# Patient Record
Sex: FEMALE | Race: BLACK OR AFRICAN AMERICAN | Employment: UNEMPLOYED | URBAN - METROPOLITAN AREA
[De-identification: names, ages, dates, MRNs, and addresses within clinical notes are randomized per-mention and may not be internally consistent; named-entity substitution may affect disease eponyms.]

---

## 2020-12-14 ENCOUNTER — HOSPITAL ENCOUNTER (OUTPATIENT)
Age: 55
Discharge: HOME | End: 2020-12-14
Payer: MEDICARE

## 2020-12-14 PROCEDURE — U0003 INFECTIOUS AGENT DETECTION BY NUCLEIC ACID (DNA OR RNA); SEVERE ACUTE RESPIRATORY SYNDROME CORONAVIRUS 2 (SARS-COV-2) (CORONAVIRUS DISEASE [COVID-19]), AMPLIFIED PROBE TECHNIQUE, MAKING USE OF HIGH THROUGHPUT TECHNOLOGIES AS DESCRIBED BY CMS-2020-01-R: HCPCS

## 2020-12-15 ENCOUNTER — ANESTHESIA EVENT (OUTPATIENT)
Dept: OPERATING ROOM | Age: 55
End: 2020-12-15
Payer: MEDICARE

## 2020-12-15 LAB
SARS-COV-2, RAPID: NORMAL
SARS-COV-2: NORMAL
SARS-COV-2: NOT DETECTED
SOURCE: NORMAL

## 2020-12-16 ENCOUNTER — HOSPITAL ENCOUNTER (OUTPATIENT)
Age: 55
Setting detail: OUTPATIENT SURGERY
Discharge: HOME | End: 2020-12-16
Attending: PODIATRIST | Admitting: PODIATRIST
Payer: MEDICARE

## 2020-12-16 ENCOUNTER — ANESTHESIA (OUTPATIENT)
Dept: OPERATING ROOM | Age: 55
End: 2020-12-16
Payer: MEDICARE

## 2020-12-16 VITALS
SYSTOLIC BLOOD PRESSURE: 121 MMHG | TEMPERATURE: 97.5 F | HEART RATE: 69 BPM | DIASTOLIC BLOOD PRESSURE: 67 MMHG | RESPIRATION RATE: 12 BRPM | OXYGEN SATURATION: 100 %

## 2020-12-16 VITALS — TEMPERATURE: 96.8 F | DIASTOLIC BLOOD PRESSURE: 75 MMHG | SYSTOLIC BLOOD PRESSURE: 124 MMHG | OXYGEN SATURATION: 100 %

## 2020-12-16 LAB
GFR NON-AFRICAN AMERICAN: 45 ML/MIN
GFR SERPL CREATININE-BSD FRML MDRD: 55 ML/MIN
GFR SERPL CREATININE-BSD FRML MDRD: ABNORMAL ML/MIN/{1.73_M2}
GLUCOSE BLD-MCNC: 109 MG/DL (ref 74–100)
GLUCOSE BLD-MCNC: 91 MG/DL (ref 65–105)
POC CREATININE: 1.23 MG/DL (ref 0.51–1.19)
POC POTASSIUM: 4.2 MMOL/L (ref 3.5–4.5)

## 2020-12-16 PROCEDURE — 82565 ASSAY OF CREATININE: CPT

## 2020-12-16 PROCEDURE — 82947 ASSAY GLUCOSE BLOOD QUANT: CPT

## 2020-12-16 PROCEDURE — 2500000003 HC RX 250 WO HCPCS: Performed by: NURSE ANESTHETIST, CERTIFIED REGISTERED

## 2020-12-16 PROCEDURE — 3700000001 HC ADD 15 MINUTES (ANESTHESIA): Performed by: PODIATRIST

## 2020-12-16 PROCEDURE — 7100000040 HC SPAR PHASE II RECOVERY - FIRST 15 MIN: Performed by: PODIATRIST

## 2020-12-16 PROCEDURE — 2500000003 HC RX 250 WO HCPCS: Performed by: PODIATRIST

## 2020-12-16 PROCEDURE — C1713 ANCHOR/SCREW BN/BN,TIS/BN: HCPCS | Performed by: PODIATRIST

## 2020-12-16 PROCEDURE — 2720000010 HC SURG SUPPLY STERILE: Performed by: PODIATRIST

## 2020-12-16 PROCEDURE — 2580000003 HC RX 258: Performed by: ANESTHESIOLOGY

## 2020-12-16 PROCEDURE — 84132 ASSAY OF SERUM POTASSIUM: CPT

## 2020-12-16 PROCEDURE — 6360000002 HC RX W HCPCS: Performed by: NURSE ANESTHETIST, CERTIFIED REGISTERED

## 2020-12-16 PROCEDURE — 3600000014 HC SURGERY LEVEL 4 ADDTL 15MIN: Performed by: PODIATRIST

## 2020-12-16 PROCEDURE — 7100000041 HC SPAR PHASE II RECOVERY - ADDTL 15 MIN: Performed by: PODIATRIST

## 2020-12-16 PROCEDURE — 2580000003 HC RX 258: Performed by: PODIATRIST

## 2020-12-16 PROCEDURE — 3700000000 HC ANESTHESIA ATTENDED CARE: Performed by: PODIATRIST

## 2020-12-16 PROCEDURE — 2709999900 HC NON-CHARGEABLE SUPPLY: Performed by: PODIATRIST

## 2020-12-16 PROCEDURE — 3600000004 HC SURGERY LEVEL 4 BASE: Performed by: PODIATRIST

## 2020-12-16 DEVICE — K WIRE FIX L4IN DIA0.045IN DBL END TRCR SMOOTH: Type: IMPLANTABLE DEVICE | Site: TOES | Status: FUNCTIONAL

## 2020-12-16 RX ORDER — CALCIUM CARBONATE 500(1250)
1 TABLET ORAL 3 TIMES DAILY
COMMUNITY
End: 2022-09-17

## 2020-12-16 RX ORDER — ATORVASTATIN CALCIUM 40 MG/1
40 TABLET, FILM COATED ORAL DAILY
COMMUNITY

## 2020-12-16 RX ORDER — LIDOCAINE HYDROCHLORIDE 10 MG/ML
INJECTION, SOLUTION EPIDURAL; INFILTRATION; INTRACAUDAL; PERINEURAL PRN
Status: DISCONTINUED | OUTPATIENT
Start: 2020-12-16 | End: 2020-12-16 | Stop reason: SDUPTHER

## 2020-12-16 RX ORDER — HYDROCODONE BITARTRATE AND ACETAMINOPHEN 5; 325 MG/1; MG/1
1 TABLET ORAL EVERY 6 HOURS PRN
Qty: 28 TABLET | Refills: 0 | Status: SHIPPED | OUTPATIENT
Start: 2020-12-16 | End: 2020-12-23

## 2020-12-16 RX ORDER — FENTANYL CITRATE 50 UG/ML
INJECTION, SOLUTION INTRAMUSCULAR; INTRAVENOUS PRN
Status: DISCONTINUED | OUTPATIENT
Start: 2020-12-16 | End: 2020-12-16 | Stop reason: SDUPTHER

## 2020-12-16 RX ORDER — CARVEDILOL 6.25 MG/1
6.25 TABLET ORAL 2 TIMES DAILY
COMMUNITY

## 2020-12-16 RX ORDER — FUROSEMIDE 20 MG/1
20 TABLET ORAL 2 TIMES DAILY
COMMUNITY
End: 2022-09-17

## 2020-12-16 RX ORDER — MAGNESIUM HYDROXIDE 1200 MG/15ML
LIQUID ORAL CONTINUOUS PRN
Status: COMPLETED | OUTPATIENT
Start: 2020-12-16 | End: 2020-12-16

## 2020-12-16 RX ORDER — LANOLIN ALCOHOL/MO/W.PET/CERES
1000 CREAM (GRAM) TOPICAL DAILY
COMMUNITY

## 2020-12-16 RX ORDER — PROPOFOL 10 MG/ML
INJECTION, EMULSION INTRAVENOUS PRN
Status: DISCONTINUED | OUTPATIENT
Start: 2020-12-16 | End: 2020-12-16 | Stop reason: SDUPTHER

## 2020-12-16 RX ORDER — PROPOFOL 10 MG/ML
INJECTION, EMULSION INTRAVENOUS CONTINUOUS PRN
Status: DISCONTINUED | OUTPATIENT
Start: 2020-12-16 | End: 2020-12-16 | Stop reason: SDUPTHER

## 2020-12-16 RX ORDER — BUPIVACAINE HYDROCHLORIDE 5 MG/ML
INJECTION, SOLUTION EPIDURAL; INTRACAUDAL PRN
Status: DISCONTINUED | OUTPATIENT
Start: 2020-12-16 | End: 2020-12-16 | Stop reason: ALTCHOICE

## 2020-12-16 RX ORDER — LISINOPRIL 5 MG/1
5 TABLET ORAL DAILY
COMMUNITY

## 2020-12-16 RX ORDER — ONDANSETRON 2 MG/ML
INJECTION INTRAMUSCULAR; INTRAVENOUS PRN
Status: DISCONTINUED | OUTPATIENT
Start: 2020-12-16 | End: 2020-12-16 | Stop reason: SDUPTHER

## 2020-12-16 RX ORDER — GLYCOPYRROLATE 1 MG/5 ML
SYRINGE (ML) INTRAVENOUS PRN
Status: DISCONTINUED | OUTPATIENT
Start: 2020-12-16 | End: 2020-12-16 | Stop reason: SDUPTHER

## 2020-12-16 RX ORDER — SODIUM CHLORIDE, SODIUM LACTATE, POTASSIUM CHLORIDE, CALCIUM CHLORIDE 600; 310; 30; 20 MG/100ML; MG/100ML; MG/100ML; MG/100ML
INJECTION, SOLUTION INTRAVENOUS CONTINUOUS
Status: DISCONTINUED | OUTPATIENT
Start: 2020-12-16 | End: 2020-12-16 | Stop reason: HOSPADM

## 2020-12-16 RX ORDER — LIDOCAINE HYDROCHLORIDE 10 MG/ML
INJECTION, SOLUTION EPIDURAL; INFILTRATION; INTRACAUDAL; PERINEURAL PRN
Status: DISCONTINUED | OUTPATIENT
Start: 2020-12-16 | End: 2020-12-16 | Stop reason: ALTCHOICE

## 2020-12-16 RX ADMIN — PROPOFOL 40 MG: 10 INJECTION, EMULSION INTRAVENOUS at 07:32

## 2020-12-16 RX ADMIN — SODIUM CHLORIDE, POTASSIUM CHLORIDE, SODIUM LACTATE AND CALCIUM CHLORIDE: 600; 310; 30; 20 INJECTION, SOLUTION INTRAVENOUS at 06:30

## 2020-12-16 RX ADMIN — Medication 0.2 MG: at 07:44

## 2020-12-16 RX ADMIN — LIDOCAINE HYDROCHLORIDE 50 MG: 10 INJECTION, SOLUTION EPIDURAL; INFILTRATION; INTRACAUDAL; PERINEURAL at 07:32

## 2020-12-16 RX ADMIN — ONDANSETRON 4 MG: 2 INJECTION INTRAMUSCULAR; INTRAVENOUS at 07:47

## 2020-12-16 RX ADMIN — FENTANYL CITRATE 100 MCG: 50 INJECTION, SOLUTION INTRAMUSCULAR; INTRAVENOUS at 07:25

## 2020-12-16 RX ADMIN — PROPOFOL 50 MCG/KG/MIN: 10 INJECTION, EMULSION INTRAVENOUS at 07:32

## 2020-12-16 ASSESSMENT — PULMONARY FUNCTION TESTS
PIF_VALUE: 0
PIF_VALUE: 51
PIF_VALUE: 0
PIF_VALUE: 29
PIF_VALUE: 0
PIF_VALUE: 1
PIF_VALUE: 31
PIF_VALUE: 0
PIF_VALUE: 28
PIF_VALUE: 0
PIF_VALUE: 1
PIF_VALUE: 0
PIF_VALUE: 0
PIF_VALUE: 1
PIF_VALUE: 0
PIF_VALUE: 27
PIF_VALUE: 0
PIF_VALUE: 0
PIF_VALUE: 1
PIF_VALUE: 0
PIF_VALUE: 1
PIF_VALUE: 1
PIF_VALUE: 0
PIF_VALUE: 26
PIF_VALUE: 0

## 2020-12-16 ASSESSMENT — ENCOUNTER SYMPTOMS
WHEEZING: 0
VOMITING: 0
COUGH: 0
SHORTNESS OF BREATH: 0
NAUSEA: 0
DIARRHEA: 0
SORE THROAT: 0

## 2020-12-16 ASSESSMENT — PAIN SCALES - GENERAL: PAINLEVEL_OUTOF10: 0

## 2020-12-16 NOTE — H&P
History and Physical    Pt Name: Lavonne Smith  MRN: [id number]  YOB: 1965  Date of evaluation: 12/16/2020  Primary Care Physician: Annette Larsen    SUBJECTIVE:   History of Chief Complaint:    Lavonne Smith is a 48 y.o. female who is scheduled today for left toe arthrodesis. She reports arthritis of the left hallux and also reports having a wound on the bottom of the toe for over one month. She reports history of DM for \"some years\" and reports it is now diet controlled after sleeve gastrectomy in October of 2020, down 55 lbs since. She denies pain to left great toe at this time, but reports she has been doing home dressing changes. She had had prior great toe nail excision bilaterally in the past.   Allergies  has No Known Allergies. Medications  Prior to Admission medications    Medication Sig Start Date End Date Taking?  Authorizing Provider   lisinopril (PRINIVIL;ZESTRIL) 5 MG tablet Take 5 mg by mouth daily   Yes Historical Provider, MD   atorvastatin (LIPITOR) 40 MG tablet Take 40 mg by mouth daily   Yes Historical Provider, MD   SPIRONOLACTONE PO Take by mouth Pt states she takes half a pill once a day but doesn't know the dose   Yes Historical Provider, MD   furosemide (LASIX) 20 MG tablet Take 20 mg by mouth 2 times daily   Yes Historical Provider, MD   carvedilol (COREG) 12.5 MG tablet Take by mouth 2 times daily Pt states she does not know the dose but takes a half a pil BID   Yes Historical Provider, MD   calcium carbonate (OSCAL) 500 MG TABS tablet Take 1 tablet by mouth 3 times daily    Yes Historical Provider, MD   Prenatal Vit-Fe Fumarate-FA (PRENATAL COMPLETE PO) Take by mouth   Yes Historical Provider, MD   Cyanocobalamin (VITAMIN B-12 PO) Take 1 tablet by mouth   Yes Historical Provider, MD   Albuterol Sulfate (PROAIR HFA IN) Inhale into the lungs   Yes Historical Provider, MD   DOXYCYCLINE HYCLATE PO Take 1 tablet by mouth 2 times daily   Yes Historical Provider, MD     Past Medical History    has a past medical history of Arthritis of big toe, Asthma, CHF (congestive heart failure) ([facility] [address] CKD (chronic kidney disease), stage III, Diabetes mellitus ([address] History of anemia, History of blood transfusion, Hyperlipidemia, Hypertension, Left ventricular systolic dysfunction, Neuropathy, Nonischemic congestive cardiomyopathy ([address] and Obesity. Past Surgical History   has a past surgical history that includes  section; Sleeve Gastrectomy (10/07/2020); Cardiac catheterization (2020); and Facial cosmetic surgery (). Social History   reports that she has never smoked. She has never used smokeless tobacco.   reports previous alcohol use. reports no history of drug use. Marital Status    Children 5  Occupation none  Family History    Family history is unknown by patient. Says she doesn't ask, thinks htn in her mother/mother's side    OBJECTIVE:   VITALS:  temporal temperature is 96.8 °F (36 °C). Her blood pressure is 126/66 and her pulse is 63. Her respiration is 20 and oxygen saturation is 99%. CONSTITUTIONAL:Alert and orientated to person, place and time. No acute distress. Friendly. Quiet affect, vague historian. SKIN:  Warm & dry, no rashes on exposed skin, left great toe wrapped in gauze  HEENT: HEAD: Normocephalic, atraumatic        EYES:  PERRL, EOMs intact, conjunctiva clear      EARS:  Equal bilaterally, no edema or thickening, skin is intact without lumps or lesions. No discharge. NOSE:  Nares patent, septum midline, no rhinorrhea      MOUTH/THROAT:  Mucous membranes moist, tongue is pink, uvula midline, teeth appear to be intact  NECK:  Supple, no lymphadenopathy, full ROM  LUNGS: Respirations even and non-labored.  Clear to auscultation bilaterally, no wheezes/rales/rhonchi   CARDIOVASCULAR: regular rate and rhythm, no murmurs/rubs/gallops   ABDOMEN: soft, non-tender, non-distended, bowel sounds active x 4, obese  MUSCULOSKELETAL: Full ROM bilateral upper extremities, Full ROM bilateral lower extremities. Strength of 5/5 bilateral upper extremities. Strength 5/5 bilateral lower extremities. Left great toe in gauze dressing. Movement intact. VASCULAR:  Brisk cap refill bilateral fingers. Radial pulses are intact, 2+ bilaterally. Dorsalis pedis pulse 2+ bilaterally. No edema or varicosities bilateral lower extremities, large lower extremities  NEUROLOGIC: CN II-XII are grossly intact. Gait not assessed. IMPRESSIONS:   OSTEOARTHRITIS LEFT HALLUX      Diagnosis Date    Arthritis of big toe     Asthma     CH[facility]congestive [facility]     Dr. Yoseph Hdez    CKD (chronic kidney disease), stage III     Diabetes mellitus ([facility] [address]     controlled by diet and weight loss (sleeve gastrectomy)    History of anemia     History of blood transfusion     Hyperlipidemia     Hypertension     Left ventricular systolic dysfunction     per care everywhere notes    Neuropathy     Nonischemic congestive cardiomyopathy ([facility] [address]     per careeverywhere notes    Obesity    1.    PLANS:   TOE ARTHRODESIS (HALLUX) - Left      LISSETH OCHOA APRN-CNP  Electronically signed 12/16/2020 at 7:25 AM

## 2020-12-16 NOTE — OP NOTE
PATIENT NAME: Layla Swann  YOB: 1965  -  48 y.o. female  MRN: [id number]  DATE: 12/16/2020  BILLING #: [id number]     Surgeon(s):  Kody Salamanca DPM      ASSISTANTS: Oscar Lara DPM     PRE-OP DIAGNOSIS:   1. Osteoarthritis of the hallux interphalangeal joint, left     POST-OP DIAGNOSIS: Same as above.     PROCEDURE:   1. Hallux interphalangeal joint arthrodesis, left     ANESTHESIA: MAC/Local (10cc of a 1:1 mixture of 1% lidocaine plain and 0.5% marcaine plain)     HEMOSTASIS: Pneumatic ankle tourniquet @ 250 mmHg.     ESTIMATED BLOOD LOSS: Less than 15cc.     MATERIALS:   Implant Name Type Inv. Item Serial No.  Lot No. LRB No. Used Action   K WIRE FIX L4IN DIA0.045IN DBL END TRCR SMOOTH   K WIRE FIX L4IN DIA0.045IN DBL END TRCR SMOOTH   [employer]   Left 2 Implanted         INJECTABLES: None.     SPECIMEN:   * No specimens in log *     COMPLICATIONS: None.     FINDINGS: As expected. INDICATION FOR PROCEDURE: Patient has had a painful osteoarthritis of the left hallux interphalangeal joint for some time. The patient has failed conservative treatments and elects to undergo surgical correction. Risks and benefits were discussed. No guarantees were given or implied. Consent is signed and in the chart. PROCEDURE IN DETAIL: The patient was transported from pre-op to the operating room and placed on the operating table in the supine position with a safety strap across the lap. A pneumatic ankle tourniquet was applied. After adequate sedation by the Anesthesia, a Rod block of 10cc of 1:1 mixture of 1% lidocaine plain and 0.5% Marcaine plain was injected. The foot was then prepped and draped in the usual aseptic fashion. The foot was then exsanguinated with an Esmarch bandage. The pneumatic ankle tourniquet was inflated to 250 mmHg. Attention was directed to the left hallux interphalangeal joint. A dorsal medial incision was created over with a #15 blade.  The incision was then deepened. The skin and subcutaneous tissue were then undermined off of the capsule medially. The periosteum and capsule were then reflected and retracted. Next, a transverse incision was created over the extensor tendon in the hallux interphalangeal joint was visualized. Next, using the sagittal saw the head of the proximal phalanx was resected at the surgical neck and the base of the distal phalanx was sharply resected taking care to create a laterally based wedge to correct for the transverse plane deformity of the hallux. The surgical site was then irrigated with normal saline. The arthrodesis site was fixated with 2 crossed 0.045 Umu wires inserted from proximal to distal.  The wires were then bent, cut and capped. The surgical site was then irrigated once more with normal saline and then coapted sequentially in a layered fashion. Dressings consisted of adapic, 4 x 4s, Kerlix and an ACE bandage. The pneumatic ankle tourniquet was released and immediate hyperemic flush was noted to all five digits of the left foot. The patient tolerated the above procedure and anesthesia well without complications. The patient was transported from the operating room to the PACU with vital signs stable and vascular status intact to the foot. The patient is to follow up with Ghada Ko DPM as scheduled. Reiterated to the patient and her significant other the utmost importance of non-weight bearing except on the heel for transfers to allow for healing of her arthrodesis, the patient verbalized understanding of this.       Electronically signed by Brittany Mercado DPM on 12/16/2020 at 7:02 AM

## 2020-12-16 NOTE — H&P
History & Physical Examination  Podiatric Medicine and Surgery     Subjective     Chief Complaint: Left hallux osteoarthritis    HPI:  Loly Renae is a 48 y.o. female seen [facility] the left hallux interphalangeal joint. The patient has failed conservative therapies and presents today for surgical correction. Denies any other pedal complaints. PCP is Tri Caal    ROS:   Review of Systems   Constitutional: Negative for chills, fatigue and fever. HENT: Negative for congestion and sore throat. Respiratory: Negative for cough, shortness of breath and wheezing. Cardiovascular: Negative for chest pain and leg swelling. Gastrointestinal: Negative for diarrhea, nausea and vomiting. Endocrine: Negative for cold intolerance and heat intolerance. Genitourinary: Negative for difficulty urinating and dysuria. Musculoskeletal: Positive for arthralgias and myalgias. Negative for neck stiffness. Skin: Positive for wound. Negative for rash. Neurological: Negative for dizziness, weakness, numbness and headaches. Psychiatric/Behavioral: Negative for agitation and behavioral problems. Past Medical History   has a past medical history of Asthma, CHF (congestive heart failure) ([facility] [address] Diabetes mellitus ([facility] [address] Hyperlipidemia, and Hypertension. Past Surgical History   has a past surgical history that includes  section and other surgical history. Medications  Prior to Admission medications    Medication Sig Start Date End Date Taking?  Authorizing Provider   lisinopril (PRINIVIL;ZESTRIL) 5 MG tablet Take 5 mg by mouth daily   Yes Historical Provider, MD   atorvastatin (LIPITOR) 40 MG tablet Take 40 mg by mouth daily   Yes Historical Provider, MD   SPIRONOLACTONE PO Take by mouth Pt states she takes half a pill once a day but doesn't know the dose   Yes Historical Provider, MD   furosemide (LASIX) 20 MG tablet Take 20 mg by mouth 2 times daily   Yes Historical Provider, MD   carvedilol (COREG) 12.5 MG tablet Take by mouth 2 times daily Pt states she does not know the dose but takes a half a pil BID   Yes Historical Provider, MD   calcium carbonate (OSCAL) 500 MG TABS tablet Take 1 tablet by mouth 3 times daily    Yes Historical Provider, MD   Prenatal Vit-Fe Fumarate-FA (PRENATAL COMPLETE PO) Take by mouth   Yes Historical Provider, MD   Cyanocobalamin (VITAMIN B-12 PO) Take 1 tablet by mouth   Yes Historical Provider, MD   Albuterol Sulfate (PROAIR HFA IN) Inhale into the lungs   Yes Historical Provider, MD   DOXYCYCLINE HYCLATE PO Take 1 tablet by mouth 2 times daily   Yes Historical Provider, MD    Scheduled Meds:  Continuous Infusions:   lactated ringers      sodium chloride       PRN Meds:.sodium chloride    Allergies  has No Known Allergies. Family History  family history is not on file. Social History   reports that she has never smoked. She has never used smokeless tobacco.   reports previous alcohol use. reports no history of drug use. Objective     Vitals:  Patient Vitals for the past 8 hrs:   BP Temp Temp src Pulse Resp SpO2   20 0651 126/66 96.8 °F (36 °C) Temporal 63 20 99 %     Average, Min, and Max for last 24 hours Vitals:  TEMPERATURE:  Temp  Av.8 °F (36 °C)  Min: 96.8 °F (36 °C)  Max: 96.8 °F (36 °C)    RESPIRATIONS RANGE: Resp  Av  Min: 20  Max: 20    PULSE RANGE: Pulse  Av  Min: 63  Max: 63    BLOOD PRESSURE RANGE:  Systolic (82NCX), QJ , Min:126 , EXQ:483   ; Diastolic (71MUK), LKD:40, Min:66, Max:66      PULSE OXIMETRY RANGE: SpO2  Av %  Min: 99 %  Max: 99 %  I&O:  No intake/output data recorded. CBC:  No results for input(s): WBC, HGB, HCT, PLT, CRP in the last 72 hours. Invalid input(s):  ESR     BMP:  Recent Labs     20  0707   CREATININE 1.23*        Coags:  No results for input(s): APTT, PROT, INR in the last 72 hours.     No results found for: LABA1C  No results found for: SEDRATE   No results found for: CRP     Physical Exam:    General: A&Ox3, NAD  Heart: Regular rate and rhythm. Lungs: Equal air entry. No increased effort. Abdomen: Soft, non-tender to palpation. Not distended. Left Lower Extremity Physical Exam:  Vascular: DP and PT pulses are palpable. CFT <3 seconds to all digits. Hair growth is absent to the level of the digits. No edema. Neuro: Saph/sural/SP/DP/plantar sensation intact to light touch. Musculoskeletal: Muscle strength is 5/5 to all lower extremity muscle groups. Gross deformity is absent. Dermatologic: Partial thickness wound left distal hallux. No active bleeding or purulence noted. Clinical:  None. Imaging:   No orders to display         Assessment     Isrrael Martin is a 48 y.o. female with   1. Osteoarthritis of the hallux interphalangeal joint, left    Active Problems:    * No active hospital problems. *  Resolved Problems:    * No resolved hospital problems. *       Plan     · Patient examined and evaluated at bedside. · Treatment options discussed in detail with the patient. · Will proceed with left HIPJ arthrodesis. · Discussed with Dr. Rocco Baker.       Electronically signed by David Moore DPM on 12/16/2020 at 7:18 AM

## 2020-12-16 NOTE — ANESTHESIA POSTPROCEDURE EVALUATION
Department of Anesthesiology  Postprocedure Note    Patient: Robert Reynolds  MRN: [id number]  YOB: 1965  Date of evaluation: 12/16/2020  Time:  11:58 AM     Procedure Summary     Date: 12/16/20 Room / Location: [address]    Anesthesia Start: 5815 Anesthesia Stop: 0825    Procedure: TOE ARTHRODESIS ([address]) (Left ) Diagnosis: (OSTEOARTHRITIS LEFT HALLUX)    Surgeons: Gustavo Leach DPM Responsible Provider: Bernard Roberts MD    Anesthesia Type: MAC, TIVA ASA Status: 3          Anesthesia Type: MAC, TIVA    [place] Phase I: [place] Score: 8    [place] Phase II:      Last vitals: Reviewed and per EMR flowsheets.        Anesthesia Post Evaluation    Patient location during evaluation: PACU  Patient participation: complete - patient participated  Level of consciousness: awake and alert  Pain score: 0  Airway patency: patent  Nausea & Vomiting: no nausea and no vomiting  Complications: no  Cardiovascular status: hemodynamically stable  Respiratory status: room air  Hydration status: euvolemic

## 2020-12-16 NOTE — ANESTHESIA PRE PROCEDURE
controled by diet and wieght loss    Hyperlipidemia     Hypertension        Past Surgical History:        Procedure Laterality Date     SECTION      x2    OTHER SURGICAL HISTORY      weight loss surgery       Social History:    Social History     Tobacco Use    Smoking status: Never Smoker    Smokeless tobacco: Never Used   Substance Use Topics    Alcohol use: Not on file                                Counseling given: Not Answered      Vital Signs (Current):   Vitals:    20 0651   BP: 126/66   Pulse: 63   Resp: 20   Temp: 96.8 °F (36 °C)   TempSrc: Temporal   SpO2: 99%                                              BP Readings from Last 3 Encounters:   20 126/66       NPO Status: Time of last liquid consumption:                         Time of last solid consumption:                         Date of last liquid consumption: 12/15/20                        Date of last solid food consumption: 12/15/20    BMI:   Wt Readings from Last 3 Encounters:   No data found for Wt     There is no height or weight on file to calculate BMI.    CBC: No results found for: WBC, RBC, HGB, HCT, MCV, RDW, PLT    CMP:   Lab Results   Component Value Date    CREATININE 1.23 2020    LABGLOM 45 2020       POC Tests:   Recent Labs     20  0707   POCGLU 109*   POCK 4.2       Coags: No results found for: PROTIME, INR, APTT    HCG (If Applicable): No results found for: PREGTESTUR, PREGSERUM, HCG, HCGQUANT     ABGs: No results found for: PHART, PO2ART, XID4YUT, PPR7DYF, BEART, [id number]     Type & Screen (If Applicable):  No results found for: LABABO, LABRH    Drug/Infectious Status (If Applicable):  No results found for: HIV, HEPCAB    COVID-19 Screening (If Applicable):   Lab Results   Component Value Date    COVID19 Not Detected 2020         Anesthesia Evaluation  Patient summary reviewed and Nursing notes reviewed no history of anesthetic complications:   Airway: Mallampati: II TM distance: >3 FB   Neck ROM: full  Mouth opening: > = 3 FB Dental: normal exam         Pulmonary:normal exam    (+) asthma:                            Cardiovascular:  Exercise tolerance: good (>4 METS),   (+) hypertension:, CHF:, hyperlipidemia      ECG reviewed  Rhythm: regular  Rate: normal      Cleared by cardiology     Beta Blocker:  Not on Beta Blocker         Neuro/Psych:   Negative Neuro/Psych ROS              GI/Hepatic/Renal:   (+) morbid obesity          Endo/Other:    (+) Diabetes, . Abdominal:           Vascular:                                        Anesthesia Plan      MAC and TIVA     ASA 3       Induction: intravenous. MIPS: Postoperative opioids intended and Prophylactic antiemetics administered. Anesthetic plan and risks discussed with patient.       Plan discussed with CRNA and surgical team.                  Orquidea Meier MD   12/16/2020 alert

## 2022-09-17 ENCOUNTER — HOSPITAL ENCOUNTER (INPATIENT)
Age: 57
LOS: 3 days | Discharge: HOME | DRG: 314 | End: 2022-09-20
Attending: EMERGENCY MEDICINE | Admitting: INTERNAL MEDICINE
Payer: MEDICARE

## 2022-09-17 ENCOUNTER — APPOINTMENT (OUTPATIENT)
Dept: GENERAL RADIOLOGY | Age: 57
DRG: 314 | End: 2022-09-17
Payer: MEDICARE

## 2022-09-17 DIAGNOSIS — M86.9 OSTEOMYELITIS OF GREAT TOE (HCC): Primary | ICD-10-CM

## 2022-09-17 DIAGNOSIS — G89.18 POST-OP PAIN: ICD-10-CM

## 2022-09-17 DIAGNOSIS — M86.072 ACUTE HEMATOGENOUS OSTEOMYELITIS OF LEFT FOOT (HCC): ICD-10-CM

## 2022-09-17 DIAGNOSIS — Z98.890 POST-OPERATIVE STATE: ICD-10-CM

## 2022-09-17 PROBLEM — Z86.73 HISTORY OF CVA (CEREBROVASCULAR ACCIDENT): Status: ACTIVE | Noted: 2022-03-24

## 2022-09-17 PROBLEM — E78.5 HYPERLIPIDEMIA, UNSPECIFIED: Status: ACTIVE | Noted: 2022-03-07

## 2022-09-17 PROBLEM — R19.7 DIARRHEA: Status: ACTIVE | Noted: 2022-09-17

## 2022-09-17 PROBLEM — D50.8 IRON DEFICIENCY ANEMIA SECONDARY TO INADEQUATE DIETARY IRON INTAKE: Status: ACTIVE | Noted: 2020-09-29

## 2022-09-17 PROBLEM — K21.9 GASTROESOPHAGEAL REFLUX DISEASE WITHOUT ESOPHAGITIS: Status: ACTIVE | Noted: 2020-05-12

## 2022-09-17 PROBLEM — I63.9 CVA (CEREBRAL VASCULAR ACCIDENT) (HCC): Status: ACTIVE | Noted: 2022-03-24

## 2022-09-17 PROBLEM — E55.9 VITAMIN D DEFICIENCY: Status: ACTIVE | Noted: 2018-09-10

## 2022-09-17 PROBLEM — N18.30 STAGE 3 CHRONIC KIDNEY DISEASE (HCC): Status: ACTIVE | Noted: 2022-03-07

## 2022-09-17 PROBLEM — I50.9 CONGESTIVE HEART FAILURE (HCC): Status: ACTIVE | Noted: 2022-09-17

## 2022-09-17 LAB
ABSOLUTE EOS #: 0.13 K/UL (ref 0–0.44)
ABSOLUTE IMMATURE GRANULOCYTE: 0.02 K/UL (ref 0–0.3)
ABSOLUTE LYMPH #: 2.41 K/UL (ref 1.1–3.7)
ABSOLUTE MONO #: 0.69 K/UL (ref 0.1–1.2)
ANION GAP SERPL CALCULATED.3IONS-SCNC: 8 MMOL/L (ref 9–17)
BASOPHILS # BLD: 0 % (ref 0–2)
BASOPHILS ABSOLUTE: <0.03 K/UL (ref 0–0.2)
BUN BLDV-MCNC: 27 MG/DL (ref 6–20)
BUN/CREAT BLD: 18 (ref 9–20)
C-REACTIVE PROTEIN: 10.4 MG/L (ref 0–5)
CALCIUM SERPL-MCNC: 8.9 MG/DL (ref 8.6–10.4)
CHLORIDE BLD-SCNC: 109 MMOL/L (ref 98–107)
CO2: 25 MMOL/L (ref 20–31)
CREAT SERPL-MCNC: 1.46 MG/DL (ref 0.5–0.9)
EOSINOPHILS RELATIVE PERCENT: 2 % (ref 1–4)
GFR AFRICAN AMERICAN: 45 ML/MIN
GFR NON-AFRICAN AMERICAN: 37 ML/MIN
GFR SERPL CREATININE-BSD FRML MDRD: ABNORMAL ML/MIN/{1.73_M2}
GLUCOSE BLD-MCNC: 62 MG/DL (ref 70–99)
HCT VFR BLD CALC: 26 % (ref 36.3–47.1)
HEMOGLOBIN: 8.5 G/DL (ref 11.9–15.1)
IMMATURE GRANULOCYTES: 0 %
LACTIC ACID: 1.2 MMOL/L (ref 0.5–2.2)
LYMPHOCYTES # BLD: 34 % (ref 24–43)
MCH RBC QN AUTO: 30.1 PG (ref 25.2–33.5)
MCHC RBC AUTO-ENTMCNC: 32.7 G/DL (ref 28.4–34.8)
MCV RBC AUTO: 92.2 FL (ref 82.6–102.9)
MONOCYTES # BLD: 10 % (ref 3–12)
NRBC AUTOMATED: 0 PER 100 WBC
PDW BLD-RTO: 12.6 % (ref 11.8–14.4)
PLATELET # BLD: 214 K/UL (ref 138–453)
PMV BLD AUTO: 9.9 FL (ref 8.1–13.5)
POTASSIUM SERPL-SCNC: 4.2 MMOL/L (ref 3.7–5.3)
RBC # BLD: 2.82 M/UL (ref 3.95–5.11)
SEDIMENTATION RATE, ERYTHROCYTE: 54 MM/HR (ref 0–30)
SEG NEUTROPHILS: 54 % (ref 36–65)
SEGMENTED NEUTROPHILS ABSOLUTE COUNT: 3.88 K/UL (ref 1.5–8.1)
SODIUM BLD-SCNC: 142 MMOL/L (ref 135–144)
WBC # BLD: 7.1 K/UL (ref 3.5–11.3)

## 2022-09-17 PROCEDURE — 6360000002 HC RX W HCPCS: Performed by: NURSE PRACTITIONER

## 2022-09-17 PROCEDURE — 87324 CLOSTRIDIUM AG IA: CPT

## 2022-09-17 PROCEDURE — 87040 BLOOD CULTURE FOR BACTERIA: CPT

## 2022-09-17 PROCEDURE — 87449 NOS EACH ORGANISM AG IA: CPT

## 2022-09-17 PROCEDURE — 1200000000 HC SEMI PRIVATE

## 2022-09-17 PROCEDURE — 2580000003 HC RX 258: Performed by: NURSE PRACTITIONER

## 2022-09-17 PROCEDURE — 73630 X-RAY EXAM OF FOOT: CPT

## 2022-09-17 PROCEDURE — 85025 COMPLETE CBC W/AUTO DIFF WBC: CPT

## 2022-09-17 PROCEDURE — 83605 ASSAY OF LACTIC ACID: CPT

## 2022-09-17 PROCEDURE — 99285 EMERGENCY DEPT VISIT HI MDM: CPT

## 2022-09-17 PROCEDURE — 87506 IADNA-DNA/RNA PROBE TQ 6-11: CPT

## 2022-09-17 PROCEDURE — 85652 RBC SED RATE AUTOMATED: CPT

## 2022-09-17 PROCEDURE — 86140 C-REACTIVE PROTEIN: CPT

## 2022-09-17 PROCEDURE — 80048 BASIC METABOLIC PNL TOTAL CA: CPT

## 2022-09-17 PROCEDURE — 99222 1ST HOSP IP/OBS MODERATE 55: CPT | Performed by: NURSE PRACTITIONER

## 2022-09-17 RX ORDER — POTASSIUM CHLORIDE 20 MEQ/1
40 TABLET, EXTENDED RELEASE ORAL PRN
Status: DISCONTINUED | OUTPATIENT
Start: 2022-09-17 | End: 2022-09-20 | Stop reason: HOSPADM

## 2022-09-17 RX ORDER — ONDANSETRON 4 MG/1
4 TABLET, ORALLY DISINTEGRATING ORAL EVERY 8 HOURS PRN
Status: DISCONTINUED | OUTPATIENT
Start: 2022-09-17 | End: 2022-09-20 | Stop reason: HOSPADM

## 2022-09-17 RX ORDER — LANOLIN ALCOHOL/MO/W.PET/CERES
1000 CREAM (GRAM) TOPICAL DAILY
Status: DISCONTINUED | OUTPATIENT
Start: 2022-09-17 | End: 2022-09-20 | Stop reason: HOSPADM

## 2022-09-17 RX ORDER — ENOXAPARIN SODIUM 100 MG/ML
40 INJECTION SUBCUTANEOUS DAILY
Status: DISCONTINUED | OUTPATIENT
Start: 2022-09-17 | End: 2022-09-18

## 2022-09-17 RX ORDER — MAGNESIUM SULFATE 1 G/100ML
1000 INJECTION INTRAVENOUS PRN
Status: DISCONTINUED | OUTPATIENT
Start: 2022-09-17 | End: 2022-09-20 | Stop reason: HOSPADM

## 2022-09-17 RX ORDER — ACETAMINOPHEN 325 MG/1
650 TABLET ORAL EVERY 6 HOURS PRN
Status: DISCONTINUED | OUTPATIENT
Start: 2022-09-17 | End: 2022-09-20 | Stop reason: HOSPADM

## 2022-09-17 RX ORDER — SODIUM CHLORIDE 0.9 % (FLUSH) 0.9 %
10 SYRINGE (ML) INJECTION PRN
Status: DISCONTINUED | OUTPATIENT
Start: 2022-09-17 | End: 2022-09-20 | Stop reason: HOSPADM

## 2022-09-17 RX ORDER — LANOLIN ALCOHOL/MO/W.PET/CERES
325 CREAM (GRAM) TOPICAL
Status: DISCONTINUED | OUTPATIENT
Start: 2022-09-18 | End: 2022-09-20 | Stop reason: HOSPADM

## 2022-09-17 RX ORDER — CARVEDILOL 6.25 MG/1
6.25 TABLET ORAL 2 TIMES DAILY WITH MEALS
Status: DISCONTINUED | OUTPATIENT
Start: 2022-09-17 | End: 2022-09-20 | Stop reason: HOSPADM

## 2022-09-17 RX ORDER — FOLIC ACID 1 MG/1
1 TABLET ORAL DAILY
COMMUNITY

## 2022-09-17 RX ORDER — SWAB
1 SWAB, NON-MEDICATED MISCELLANEOUS DAILY
Status: DISCONTINUED | OUTPATIENT
Start: 2022-09-17 | End: 2022-09-20 | Stop reason: HOSPADM

## 2022-09-17 RX ORDER — FERROUS SULFATE 325(65) MG
325 TABLET ORAL
COMMUNITY

## 2022-09-17 RX ORDER — SODIUM CHLORIDE 9 MG/ML
INJECTION, SOLUTION INTRAVENOUS PRN
Status: DISCONTINUED | OUTPATIENT
Start: 2022-09-17 | End: 2022-09-20 | Stop reason: HOSPADM

## 2022-09-17 RX ORDER — POTASSIUM CHLORIDE 7.45 MG/ML
10 INJECTION INTRAVENOUS PRN
Status: DISCONTINUED | OUTPATIENT
Start: 2022-09-17 | End: 2022-09-20 | Stop reason: HOSPADM

## 2022-09-17 RX ORDER — ONDANSETRON 2 MG/ML
4 INJECTION INTRAMUSCULAR; INTRAVENOUS EVERY 6 HOURS PRN
Status: DISCONTINUED | OUTPATIENT
Start: 2022-09-17 | End: 2022-09-20 | Stop reason: HOSPADM

## 2022-09-17 RX ORDER — ASPIRIN 81 MG/1
1 TABLET, CHEWABLE ORAL DAILY
COMMUNITY
Start: 2022-04-01

## 2022-09-17 RX ORDER — POLYETHYLENE GLYCOL 3350 17 G/17G
17 POWDER, FOR SOLUTION ORAL DAILY PRN
Status: DISCONTINUED | OUTPATIENT
Start: 2022-09-17 | End: 2022-09-20 | Stop reason: HOSPADM

## 2022-09-17 RX ORDER — SODIUM CHLORIDE 0.9 % (FLUSH) 0.9 %
5-40 SYRINGE (ML) INJECTION EVERY 12 HOURS SCHEDULED
Status: DISCONTINUED | OUTPATIENT
Start: 2022-09-17 | End: 2022-09-20 | Stop reason: HOSPADM

## 2022-09-17 RX ORDER — ALBUTEROL SULFATE 90 UG/1
1 AEROSOL, METERED RESPIRATORY (INHALATION) EVERY 4 HOURS PRN
Status: DISCONTINUED | OUTPATIENT
Start: 2022-09-17 | End: 2022-09-20 | Stop reason: HOSPADM

## 2022-09-17 RX ORDER — ATORVASTATIN CALCIUM 40 MG/1
40 TABLET, FILM COATED ORAL DAILY
Status: DISCONTINUED | OUTPATIENT
Start: 2022-09-17 | End: 2022-09-20 | Stop reason: HOSPADM

## 2022-09-17 RX ORDER — BUMETANIDE 1 MG/1
1 TABLET ORAL DAILY
Status: DISCONTINUED | OUTPATIENT
Start: 2022-09-17 | End: 2022-09-20 | Stop reason: HOSPADM

## 2022-09-17 RX ORDER — BUMETANIDE 1 MG/1
1 TABLET ORAL 2 TIMES DAILY
COMMUNITY
Start: 2022-04-21

## 2022-09-17 RX ORDER — ASPIRIN 81 MG/1
81 TABLET, CHEWABLE ORAL DAILY
Status: DISCONTINUED | OUTPATIENT
Start: 2022-09-17 | End: 2022-09-20 | Stop reason: HOSPADM

## 2022-09-17 RX ORDER — FOLIC ACID 1 MG/1
1 TABLET ORAL DAILY
Status: DISCONTINUED | OUTPATIENT
Start: 2022-09-17 | End: 2022-09-20 | Stop reason: HOSPADM

## 2022-09-17 RX ORDER — ACETAMINOPHEN 650 MG/1
650 SUPPOSITORY RECTAL EVERY 6 HOURS PRN
Status: DISCONTINUED | OUTPATIENT
Start: 2022-09-17 | End: 2022-09-20 | Stop reason: HOSPADM

## 2022-09-17 RX ORDER — LISINOPRIL 5 MG/1
5 TABLET ORAL DAILY
Status: DISCONTINUED | OUTPATIENT
Start: 2022-09-17 | End: 2022-09-20 | Stop reason: HOSPADM

## 2022-09-17 RX ADMIN — CEFEPIME 2000 MG: 2 INJECTION, POWDER, FOR SOLUTION INTRAVENOUS at 19:34

## 2022-09-17 RX ADMIN — SODIUM CHLORIDE: 9 INJECTION, SOLUTION INTRAVENOUS at 20:49

## 2022-09-17 RX ADMIN — VANCOMYCIN HYDROCHLORIDE 2500 MG: 5 INJECTION, POWDER, LYOPHILIZED, FOR SOLUTION INTRAVENOUS at 20:52

## 2022-09-17 RX ADMIN — ENOXAPARIN SODIUM 40 MG: 100 INJECTION SUBCUTANEOUS at 22:46

## 2022-09-17 ASSESSMENT — ENCOUNTER SYMPTOMS
DIARRHEA: 1
NAUSEA: 0
CHEST TIGHTNESS: 0
COUGH: 0
ABDOMINAL PAIN: 0
CONSTIPATION: 0
COLOR CHANGE: 1
SHORTNESS OF BREATH: 0
VOMITING: 0

## 2022-09-17 ASSESSMENT — PAIN SCALES - GENERAL: PAINLEVEL_OUTOF10: 0
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
## 2022-09-17 NOTE — CONSULTS
Pharmacy dosing of initial vancomycin ordered by ED provider. 2500 mg ordered x 1. Pharmacy is waiting to see if vancomycin therapy is continued or stopped/changed by the admitting physician. Jesús Cantu.  Beryle Savannah  9/17/2022  7:35 PM
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
## 2022-09-17 NOTE — ED NOTES
Spoke with CNP, pediatry will be coming to see the patient tonight.        Star Rose RN  09/17/22 [id number]

## 2022-09-18 LAB
ANION GAP SERPL CALCULATED.3IONS-SCNC: 8 MMOL/L (ref 9–17)
BUN BLDV-MCNC: 24 MG/DL (ref 6–20)
BUN/CREAT BLD: 18 (ref 9–20)
CALCIUM SERPL-MCNC: 8.8 MG/DL (ref 8.6–10.4)
CHLORIDE BLD-SCNC: 108 MMOL/L (ref 98–107)
CO2: 25 MMOL/L (ref 20–31)
CREAT SERPL-MCNC: 1.3 MG/DL (ref 0.5–0.9)
GFR AFRICAN AMERICAN: 51 ML/MIN
GFR NON-AFRICAN AMERICAN: 42 ML/MIN
GFR SERPL CREATININE-BSD FRML MDRD: ABNORMAL ML/MIN/{1.73_M2}
GLUCOSE BLD-MCNC: 116 MG/DL (ref 70–99)
POTASSIUM SERPL-SCNC: 4.4 MMOL/L (ref 3.7–5.3)
SODIUM BLD-SCNC: 141 MMOL/L (ref 135–144)

## 2022-09-18 PROCEDURE — 99232 SBSQ HOSP IP/OBS MODERATE 35: CPT | Performed by: INTERNAL MEDICINE

## 2022-09-18 PROCEDURE — 87075 CULTR BACTERIA EXCEPT BLOOD: CPT

## 2022-09-18 PROCEDURE — 6360000002 HC RX W HCPCS: Performed by: INTERNAL MEDICINE

## 2022-09-18 PROCEDURE — 6370000000 HC RX 637 (ALT 250 FOR IP): Performed by: NURSE PRACTITIONER

## 2022-09-18 PROCEDURE — 80048 BASIC METABOLIC PNL TOTAL CA: CPT

## 2022-09-18 PROCEDURE — 93005 ELECTROCARDIOGRAM TRACING: CPT | Performed by: INTERNAL MEDICINE

## 2022-09-18 PROCEDURE — 2580000003 HC RX 258: Performed by: INTERNAL MEDICINE

## 2022-09-18 PROCEDURE — 2580000003 HC RX 258: Performed by: NURSE PRACTITIONER

## 2022-09-18 PROCEDURE — 86403 PARTICLE AGGLUT ANTBDY SCRN: CPT

## 2022-09-18 PROCEDURE — 6360000002 HC RX W HCPCS: Performed by: NURSE PRACTITIONER

## 2022-09-18 PROCEDURE — 1200000000 HC SEMI PRIVATE

## 2022-09-18 PROCEDURE — 99222 1ST HOSP IP/OBS MODERATE 55: CPT | Performed by: INTERNAL MEDICINE

## 2022-09-18 PROCEDURE — 87070 CULTURE OTHR SPECIMN AEROBIC: CPT

## 2022-09-18 PROCEDURE — 87205 SMEAR GRAM STAIN: CPT

## 2022-09-18 PROCEDURE — 36415 COLL VENOUS BLD VENIPUNCTURE: CPT

## 2022-09-18 RX ORDER — HEPARIN SODIUM 5000 [USP'U]/ML
5000 INJECTION, SOLUTION INTRAVENOUS; SUBCUTANEOUS EVERY 8 HOURS SCHEDULED
Status: DISCONTINUED | OUTPATIENT
Start: 2022-09-18 | End: 2022-09-20 | Stop reason: HOSPADM

## 2022-09-18 RX ADMIN — CEFEPIME 2000 MG: 2 INJECTION, POWDER, FOR SOLUTION INTRAVENOUS at 22:15

## 2022-09-18 RX ADMIN — HEPARIN SODIUM 5000 UNITS: 5000 INJECTION INTRAVENOUS; SUBCUTANEOUS at 08:12

## 2022-09-18 RX ADMIN — CYANOCOBALAMIN TAB 1000 MCG 1000 MCG: 1000 TAB at 08:06

## 2022-09-18 RX ADMIN — CEFEPIME 2000 MG: 2 INJECTION, POWDER, FOR SOLUTION INTRAVENOUS at 08:19

## 2022-09-18 RX ADMIN — HEPARIN SODIUM 5000 UNITS: 5000 INJECTION INTRAVENOUS; SUBCUTANEOUS at 22:13

## 2022-09-18 RX ADMIN — PROBIOTIC PRODUCT - TAB 1 TABLET: TAB at 17:16

## 2022-09-18 RX ADMIN — LISINOPRIL 5 MG: 5 TABLET ORAL at 08:05

## 2022-09-18 RX ADMIN — VANCOMYCIN HYDROCHLORIDE 1500 MG: 5 INJECTION, POWDER, LYOPHILIZED, FOR SOLUTION INTRAVENOUS at 20:23

## 2022-09-18 RX ADMIN — ASPIRIN 81 MG CHEWABLE TABLET 81 MG: 81 TABLET CHEWABLE at 08:05

## 2022-09-18 RX ADMIN — CARVEDILOL 6.25 MG: 6.25 TABLET, FILM COATED ORAL at 08:06

## 2022-09-18 RX ADMIN — CARVEDILOL 6.25 MG: 6.25 TABLET, FILM COATED ORAL at 17:16

## 2022-09-18 RX ADMIN — BUMETANIDE 1 MG: 1 TABLET ORAL at 08:05

## 2022-09-18 RX ADMIN — SODIUM CHLORIDE, PRESERVATIVE FREE 10 ML: 5 INJECTION INTRAVENOUS at 08:06

## 2022-09-18 RX ADMIN — PROBIOTIC PRODUCT - TAB 1 TABLET: TAB at 08:06

## 2022-09-18 RX ADMIN — SODIUM CHLORIDE, PRESERVATIVE FREE 10 ML: 5 INJECTION INTRAVENOUS at 20:19

## 2022-09-18 RX ADMIN — ATORVASTATIN CALCIUM 40 MG: 40 TABLET, FILM COATED ORAL at 08:06

## 2022-09-18 RX ADMIN — Medication 1 TABLET: at 08:06

## 2022-09-18 RX ADMIN — PROBIOTIC PRODUCT - TAB 1 TABLET: TAB at 14:11

## 2022-09-18 RX ADMIN — FOLIC ACID 1 MG: 1 TABLET ORAL at 08:05

## 2022-09-18 RX ADMIN — HEPARIN SODIUM 5000 UNITS: 5000 INJECTION INTRAVENOUS; SUBCUTANEOUS at 14:12

## 2022-09-18 RX ADMIN — FERROUS SULFATE TAB EC 325 MG (65 MG FE EQUIVALENT) 325 MG: 325 (65 FE) TABLET DELAYED RESPONSE at 08:06

## 2022-09-18 ASSESSMENT — ENCOUNTER SYMPTOMS
NAUSEA: 0
COUGH: 0
VOICE CHANGE: 0
SINUS PAIN: 0
COLOR CHANGE: 1
DIARRHEA: 0
FACIAL SWELLING: 0
VOMITING: 0
SHORTNESS OF BREATH: 0
CHEST TIGHTNESS: 0

## 2022-09-18 NOTE — PLAN OF CARE
Problem: Discharge Planning  Goal: Discharge to home or other facility with appropriate resources  Outcome: Progressing  Flowsheets (Taken 9/18/2022 1747)  Discharge to home or other facility with appropriate resources:   Identify barriers to discharge with patient and caregiver   Arrange for needed discharge resources and transportation as appropriate   Identify discharge learning needs (meds, wound care, etc)  Note: Progressing     Problem: Safety - Adult  Goal: Free from fall injury  Outcome: Progressing  Note: Progressing     Problem: ABCDS Injury Assessment  Goal: Absence of physical injury  Outcome: Progressing  Note: Progressing     Problem: Skin/Tissue Integrity - Adult  Goal: Incisions, wounds, or drain sites healing without S/S of infection  Outcome: Progressing  Flowsheets (Taken 9/18/2022 1747)  Incisions, Wounds, or Drain Sites Healing Without Sign and Symptoms of Infection:   TWICE DAILY: Assess and document skin integrity   TWICE DAILY: Assess and document dressing/incision, wound bed, drain sites and surrounding tissue  Note: Progressing     Problem: Musculoskeletal - Adult  Goal: Return mobility to safest level of function  Outcome: Progressing  Flowsheets (Taken 9/18/2022 1747)  Return Mobility to Safest Level of Function:   Assess patient stability and activity tolerance for standing, transferring and ambulating with or without assistive devices   Assist with transfers and ambulation using safe patient handling equipment as needed   Ensure adequate protection for wounds/incisions during mobilization  Note: Progressing     Problem: Infection - Adult  Goal: Absence of infection at discharge  Outcome: Progressing  Flowsheets (Taken 9/18/2022 1747)  Absence of infection at discharge:   Assess and monitor for signs and symptoms of infection   Monitor lab/diagnostic results   Administer medications as ordered  Note: Progressing     Problem: Chronic Conditions and Co-morbidities  Goal: Patient's chronic conditions and co-morbidity symptoms are monitored and maintained or improved  Outcome: Progressing  Flowsheets (Taken 9/18/2022 0358)  Care Plan - Patient's Chronic Conditions and Co-Morbidity Symptoms are Monitored and Maintained or Improved: Monitor and assess patient's chronic conditions and comorbid symptoms for stability, deterioration, or improvement  Note: Progressing
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
## 2022-09-18 NOTE — CONSULTS
[facility] Pharmacokinetic Monitoring Service - Vancomycin     Nilam Batres is a 65 y.o. female starting on vancomycin therapy for osteomyelitis. Pharmacy consulted by Provider: Andie Campbell for monitoring and adjustment. Target Concentration: Goal AUC/MARIIA 400-600 mg*hr/L    Additional Antimicrobials: cefepime    Pertinent Laboratory Values: Wt Readings from Last 1 Encounters:   09/18/22 220 lb (99.8 kg)     Temp Readings from Last 1 Encounters:   09/18/22 97.9 °F (36.6 °C) (Oral)     Estimated Creatinine Clearance: 56 mL/min (A) (based on SCr of 1.46 mg/dL (H)). Recent Labs     09/17/22  1810   CREATININE 1.46*   WBC 7.1     Procalcitonin: ---    Pertinent Cultures:  Culture Date Source Results   9/17/22 Blood x2 No growth <24hrs, pending   MRSA Nasal Swab: N/A. Non-respiratory infection.     Plan:  Dosing recommendations based on Bayesian software  Start vancomycin 2500 mg x1 loading dose, followed by 1500 mg every 24 hours  Anticipated AUC of 525 and trough concentration of 16 at steady state  Renal labs as indicated   Vancomycin concentration ordered for 9/19 @ 0600   Pharmacy will continue to monitor patient and adjust therapy as indicated    Thank you for the consult,  MIKI Hodge [facility]  9/18/2022 7:11 AM

## 2022-09-18 NOTE — CARE COORDINATION
Case Management Initial Discharge Plan  Brady Sanchezden,         Readmission Risk              Risk of Unplanned Readmission:  14             Met with:patient to discuss discharge plans. Information verified: address, contacts, phone number, , insurance Yes  PCP: Kathrynn Oppenheim  Date of last visit: July or aug 2022    Insurance Provider: [place] [employer]    Discharge Planning  Current Residence:  apartment  Living Arrangements:  PAOLA Mcneal has 1 stories/6 outside stairs to climb  Support Systems:  Family Members, Friends/Neighbors  Current Services PTA:  none Agency: Previous use of [address]  Patient able to perform ADL's:Independent  DME in home:  walker, glucometer, bath bench, toilet riser  DME used to aid ambulation prior to admission:   none  DME used during admission:  TBD    Potential Assistance Needed:  [address]: [address] Medications:  No  Does patient want to participate in local refill/ meds to beds program?       Patient agreeable to home care: possibly if needed  Freedom of choice provided:  n/a      Type of Home Care Services:  None  Patient expects to be discharged to:       Prior SNF/Rehab Placement and Facility: [facility] to SNF/Rehab: No  [place] of choice provided: n/a   Evaluation: n/a    Expected Discharge date: Follow Up Appointment: Best Day/ Time: Monday AM    Transportation provider: family  Transportation arrangements needed for discharge: No    Discharge Plan:   Met with patient to review plan of care. Cardiac consult for surgery clearance and cleared for possible foot surgery later in the week. MRI left foot to r/o osteomyelitis. Vascular consult. Cefepime and Vanco.    Pt lives alone. States she has no problem with the steps to her apartment. Has a walker if needed. Explained will follow test results and surgery in case she needs IV antibiotics.   Hx CHF, CVA and DM.        Electronically signed by Deshawn Gannon on 9/18/22 at 2:02 PM EDT

## 2022-09-18 NOTE — CONSULTS
Consultation Note  Podiatric Medicine and Surgery     Subjective     Chief Complaint: Left great toe injury    HPI:  Patria Jeans is a 65 y.o. female seen at [facility] for left great toe injury. He states that the wound developed while she was on vacation in [place] last week she flew home today and got admitted as she was in ED and nongranular and was started on vancomycin. She was told that she should visit her podiatrist once she gets back to [place] given the condition of her great toe. At this time she rates her pain 0 out of 10. She has a history of cardiac and vascular issues. Patient denies any other pedal complaints at this time    PCP is MARTÍNEZ MCKEON    ROS:   Review of Systems   Constitutional:  Positive for activity change. Negative for chills, fatigue and fever. HENT:  Negative for facial swelling, sinus pain and voice change. Respiratory:  Negative for cough, chest tightness and shortness of breath. Cardiovascular:  Positive for leg swelling. Gastrointestinal:  Negative for diarrhea, nausea and vomiting. Musculoskeletal:  Positive for arthralgias. Skin:  Positive for color change and wound. Neurological:  Negative for weakness and numbness. Past Medical History   has a past medical history of Arthritis of big toe, Asthma, CHF (congestive heart failure) ([address] CKD (chronic kidney disease), stage III ([address] Diabetes mellitus ([address] History of anemia, History of blood transfusion, Hyperlipidemia, Hypertension, Left ventricular systolic dysfunction, Neuropathy, Nonischemic congestive cardiomyopathy ([address] and Obesity. Past Surgical History   has a past surgical history that includes  section; Sleeve Gastrectomy (10/07/2020); Cardiac catheterization (2020); Facial cosmetic surgery (); Foot surgery (Right); toenail excision (Bilateral); Toe Surgery (Left, 2020); and arthrodesis (Left, 2020).     Medications  Prior to Admission medications    Medication Sig Start Date End Date Taking?  Authorizing Provider   bumetanide (BUMEX) 1 MG tablet Take 1 tablet by mouth daily 4/21/22  Yes Historical Provider, MD   aspirin (ASPIRIN 81) 81 MG chewable tablet Take 1 tablet by mouth daily 4/1/22  Yes Historical Provider, MD   ferrous sulfate (IRON 325) 325 (65 Fe) MG tablet Take 325 mg by mouth daily (with breakfast)   Yes Historical Provider, MD   folic acid (FOLVITE) 1 MG tablet Take 1 mg by mouth daily   Yes Historical Provider, MD   lisinopril (PRINIVIL;ZESTRIL) 5 MG tablet Take 5 mg by mouth daily    Historical Provider, MD   atorvastatin (LIPITOR) 40 MG tablet Take 40 mg by mouth daily    Historical Provider, MD   carvedilol (COREG) 12.5 MG tablet Take by mouth 2 times daily Pt states she does not know the dose but takes a half a pil BID    Historical Provider, MD   Prenatal Vit-Fe Fumarate-FA (PRENATAL COMPLETE PO) Take by mouth    Historical Provider, MD   Cyanocobalamin (VITAMIN B-12 PO) Take 1 tablet by mouth    Historical Provider, MD   Albuterol Sulfate (PROAIR HFA IN) Inhale into the lungs    Historical Provider, MD   DOXYCYCLINE HYCLATE PO Take 1 tablet by mouth 2 times daily    Historical Provider, MD    Scheduled Meds:   vancomycin  1,500 mg IntraVENous Q24H    vancomycin (VANCOCIN) intermittent dosing (placeholder)   Other RX Placeholder    heparin (porcine)  5,000 Units SubCUTAneous 3 times per day    sodium chloride flush  5-40 mL IntraVENous 2 times per day    cefepime  2,000 mg IntraVENous Q12H    prenatal vitamin  1 tablet Oral Daily    bumetanide  1 mg Oral Daily    atorvastatin  40 mg Oral Daily    lisinopril  5 mg Oral Daily    vitamin B-12  1,000 mcg Oral Daily    carvedilol  6.25 mg Oral BID WC    lactobacillus  1 tablet Oral TID WC    aspirin  81 mg Oral Daily    ferrous sulfate  325 mg Oral Daily with breakfast    folic acid  1 mg Oral Daily     Continuous Infusions:   sodium chloride 20 mL/hr at 09/18/22 0605     PRN Meds:.sodium chloride flush, sodium chloride, potassium chloride **OR** potassium alternative oral replacement **OR** potassium chloride, magnesium sulfate, ondansetron **OR** ondansetron, polyethylene glycol, acetaminophen **OR** acetaminophen, albuterol sulfate HFA    Allergies  has No Known Allergies. Family History  family history includes Blindness in her maternal grandfather; Cataracts in her maternal grandfather; Diabetes in her brother, father, and mother; Glaucoma in her father and paternal grandfather; Heart Disease in her brother; High Cholesterol in her brother, father, and mother; Kidney Disease in her brother. Social History   reports that she has never smoked. She has never used smokeless tobacco.   reports that she does not currently use alcohol. reports no history of drug use. Objective     Vitals:  Patient Vitals for the past 8 hrs:   BP Temp Temp src Pulse Resp SpO2 Weight   22 0659 126/66 97.9 °F (36.6 °C) Oral 66 16 100 % --   22 0120 -- -- -- -- -- -- 220 lb (99.8 kg)     Average, Min, and Max for last 24 hours Vitals:  TEMPERATURE:  Temp  Av.8 °F (36.6 °C)  Min: 97.5 °F (36.4 °C)  Max: 98 °F (36.7 °C)    RESPIRATIONS RANGE: Resp  Av.7  Min: 16  Max: 18    PULSE RANGE: Pulse  Av.7  Min: 66  Max: 84    BLOOD PRESSURE RANGE:  Systolic (67GHF), NZC:076 , Min:126 , WPC:099   ; Diastolic (71RAF), BLESSING:66, Min:66, Max:69      PULSE OXIMETRY RANGE: SpO2  Av %  Min: 100 %  Max: 100 %  I&O:  I/O last 3 completed shifts: In: 539.5 [I.V.:113.6; IV Piggyback:425.9]  Out: 350 [Stool:350]    CBC:  Recent Labs     22   WBC 7.1   HGB 8.5*   HCT 26.0*      CRP 10.4*        BMP:  Recent Labs     22      K 4.2   *   CO2 25   BUN 27*   CREATININE 1.46*   GLUCOSE 62*   CALCIUM 8.9        Coags:  No results for input(s): APTT, PROT, INR in the last 72 hours.     No results found for: LABA1C  Lab Results   Component Value Date SEDRATE 54 (H) 09/17/2022     Lab Results   Component Value Date    CRP 10.4 (H) 09/17/2022         Physical Exam:  Vascular: DP and PT pulses are none palpable but slightly audible on Doppler. CFT < 4 seconds  brisk to all digits. Hair growth is absent to the level of the digits. Moderate edema appreciated to the left lower extremity. Neuro: Saph/sural/SP/DP/plantar sensation intact to light touch. Musculoskeletal: Muscle strength is 4/5 to all lower extremity muscle groups. Compartments are soft and compressible. No pain with compression of posterior calf. Dermatologic: Full thickness ulcer #1 noted to the dorsal aspect of the left great toe and measures approximately 2cm x 0.8 x 1cm. Base is fibrogranular. Periwound skin is macerated with fluid underneath. Drainage noted with no associated mal odor. Mild erythema appreciated with no warmth. Does not probe to bone, sinus track, or undermine. No fluctuance, crepitus, or induration. Interdigital maceration absent. Clinical:   See media panel    Imaging:   XR FOOT LEFT (MIN 3 VIEWS)   Final Result   Osteomyelitis of the tuft of the distal phalanx of the great toe.          VL Arterial PVR Lower    (Results Pending)   MRI FOOT LEFT WO CONTRAST    (Results Pending)       Cultures: Cultures taken pending results    Assessment     Kala Juarez is a 65 y.o. female with   Full-thickness ulceration down to level of subcutaneous, left foot  Osteomyelitis, left foot  History of CVA  Hypertension  CHF  PVD    Principal Problem:    Osteomyelitis ([address]  Active Problems:    Hyperlipidemia, unspecified    History of CVA (cerebrovascular accident)    Asthma    Essential hypertension    Gastroesophageal reflux disease without esophagitis    Iron deficiency anemia secondary to inadequate dietary iron intake    Stage 3 chronic kidney disease (HCC)    Vitamin D deficiency    Congestive heart failure ([address]    Diarrhea  Resolved Problems:    * No resolved hospital problems. *        Plan     Patient examined and evaluated at bedside. Treatment options discussed in detail with the patient. Plain film radiographs ordered. Positive for osteomyelitis. MRI to understand the extend of osteomyelitis. Radiographs reviewed and discussed in detail with the patient. Medical management per Internal medicine. ID consulted. Abx: Cefepime and vancomycin  Cultures obtained and sent to lab  Vascular surgery will be consulted after we get PVR results  NIVS ordered due to diminished peripheral pulses. Cardiology consulted for cardiac clearance for surgical intervention  Dressing applied to Left foot: plain packing, DSD and tape  WBAT to heel Left lower extremity. Will discuss with Dr. Luis Alberto Luna. Ashli Timmons DPM   Podiatric Medicine & Surgery   9/18/2022 at 9:07 AM      This note is created with the assistance of a speech recognition program.  While intending to generate a document that actually reflects the content of the visit, the document can still have some errors including those of syntax and sound a like substitutions which may escape proof reading. In such instances, actual meaning can be extrapolated by contextual diversion.
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
## 2022-09-18 NOTE — PLAN OF CARE
for standing, transferring and ambulating with or without assistive devices   Assist with transfers and ambulation using safe patient handling equipment as needed   Ensure adequate protection for wounds/incisions during mobilization   Obtain physical therapy/occupational therapy consults as needed   Instruct patient/family in ordered activity level     Problem: Musculoskeletal - Adult  Goal: Return ADL status to a safe level of function  Outcome: Progressing  Flowsheets (Taken 9/17/2022 2216)  Return ADL Status to a Safe Level of Function:   Administer medication as ordered   Assess activities of daily living deficits and provide assistive devices as needed   Obtain physical therapy/occupational therapy consults as needed   Assist and instruct patient to increase activity and self care as tolerated     Problem: Gastrointestinal - Adult  Goal: Maintains or returns to baseline bowel function  Outcome: Progressing  Flowsheets (Taken 9/17/2022 2216)  Maintains or returns to baseline bowel function:   Assess bowel function   Administer ordered medications as needed   Encourage mobilization and activity     Problem: Infection - Adult  Goal: Absence of infection at discharge  Outcome: Progressing  Flowsheets (Taken 9/17/2022 2216)  Absence of infection at discharge:   Assess and monitor for signs and symptoms of infection   Monitor lab/diagnostic results   Monitor all insertion sites i.e., indwelling lines, tubes and drains   Administer medications as ordered   Instruct and encourage patient and family to use good hand hygiene technique   Identify and instruct in appropriate isolation precautions for identified infection/condition     Problem: Metabolic/Fluid and Electrolytes - Adult  Goal: Electrolytes maintained within normal limits  Outcome: Progressing  Flowsheets (Taken 9/17/2022 2216)  Electrolytes maintained within normal limits:   Monitor labs and assess patient for signs and symptoms of electrolyte imbalances Administer electrolyte replacement as ordered   Monitor response to electrolyte replacements, including repeat lab results as appropriate     Problem: Metabolic/Fluid and Electrolytes - Adult  Goal: Hemodynamic stability and optimal renal function maintained  Outcome: Progressing  Flowsheets (Taken 9/17/2022 2216)  Hemodynamic stability and optimal renal function maintained:   Monitor labs and assess for signs and symptoms of volume excess or deficit   Monitor intake, output and patient weight   Monitor urine specific gravity, serum osmolarity and serum sodium as indicated or ordered     Problem: Metabolic/Fluid and Electrolytes - Adult  Goal: Glucose maintained within prescribed range  Outcome: Progressing  Flowsheets (Taken 9/17/2022 2216)  Glucose maintained within prescribed range: Monitor blood glucose as ordered

## 2022-09-18 NOTE — PROGRESS NOTES
origionally injured her left great toe on Wednesday and was visiting her brother in [place]. Patient was admitted to the hospital (9/14/2022-9/17/2022) and left AMA but advised to follow up in ED when she got home. Patient states that her feet and legs have been swollen and she developed a \"bump\" on her left great toe. Wednesday night the lights were turned off and she hit her toe going up the stairs and felt fluid come from her toe. When she turned the lights on to evaluate her toe, she noticed that there was a mixture of blood and purulent drainage coming from her toe. Patient was admitted and discharged on Doxycycline and advised to follow up. Patient also states that she is experiencing diarrhea from all of the antibiotics. Patient denies any fevers, chills, chest pain, shortness of breath, nausea and vomiting. Patient has a significant past medical history of CHF, CKD, DM, HLD, HTN, LUIS, CVA, and GERD. Throughout the emergency room evaluation it was noted that her chloride level is 109. BUN 27. Creat 1.46. GFR 45. CRP 10.4. Hemoglobin 8.5. Sed rate 54. An Xray was obtained of her left foot which shows: Osteomyelitis of the tuft of the distal phalanx of the great toe. Review of Systems:     Constitutional:  negative for chills, fevers, sweats  Respiratory:  negative for cough, dyspnea on exertion, shortness of breath, wheezing  Cardiovascular:  negative for chest pain, chest pressure/discomfort, lower extremity edema, palpitations  Gastrointestinal:  negative for abdominal pain, constipation, +diarrhea, denies nausea, vomiting  Neurological:  negative for dizziness, headache    Medications:      Allergies:  No Known Allergies    Current Meds:   Scheduled Meds:    vancomycin  1,500 mg IntraVENous Q24H    vancomycin (VANCOCIN) intermittent dosing (placeholder)   Other RX Placeholder    heparin (porcine)  5,000 Units SubCUTAneous 3 times per day    sodium chloride flush  5-40 mL IntraVENous 2 times per day    cefepime  2,000 mg IntraVENous Q12H    prenatal vitamin  1 tablet Oral Daily    bumetanide  1 mg Oral Daily    atorvastatin  40 mg Oral Daily    lisinopril  5 mg Oral Daily    vitamin B-12  1,000 mcg Oral Daily    carvedilol  6.25 mg Oral BID WC    lactobacillus  1 tablet Oral TID WC    aspirin  81 mg Oral Daily    ferrous sulfate  325 mg Oral Daily with breakfast    folic acid  1 mg Oral Daily     Continuous Infusions:    sodium chloride 20 mL/hr at 22 0605     PRN Meds: sodium chloride flush, sodium chloride, potassium chloride **OR** potassium alternative oral replacement **OR** potassium chloride, magnesium sulfate, ondansetron **OR** ondansetron, polyethylene glycol, acetaminophen **OR** acetaminophen, albuterol sulfate HFA    Data:     Past Medical History:   has a past medical history of Arthritis of big toe, Asthma, CHF (congestive heart failure) ([facility] [address] CKD (chronic kidney disease), stage III ([facility] [address] Diabetes mellitus ([facility] [address] History of anemia, History of blood transfusion, Hyperlipidemia, Hypertension, Left ventricular systolic dysfunction, Neuropathy, Nonischemic congestive cardiomyopathy ([facility] [address] and Obesity. Social History:   reports that she has never smoked. She has never used smokeless tobacco. She reports that she does not currently use alcohol. She reports that she does not use drugs.      Family History:   Family History   Problem Relation Age of Onset    High Cholesterol Mother     Diabetes Mother     High Cholesterol Father     Diabetes Father     Glaucoma Father     High Cholesterol Brother     Diabetes Brother     Heart Disease Brother     Kidney Disease Brother     Blindness Maternal Grandfather     Cataracts Maternal Grandfather     Glaucoma Paternal Grandfather        Vitals:  /66   Pulse 66   Temp 97.9 °F (36.6 °C) (Oral)   Resp 16   Ht 5' 11\" (1.803 m)   Wt 220 lb (99.8 kg)   SpO2 100%   BMI 30.68 kg/m²   Temp (24hrs), Av.8 °F (36.6 °C), Min:97.5 °F (36.4 °C), Max:98 °F (36.7 °C)    No results for input(s): POCGLU in the last 72 hours. I/O (24Hr): Intake/Output Summary (Last 24 hours) at 9/18/2022 1426  Last data filed at 9/18/2022 0605  Gross per 24 hour   Intake 539.47 ml   Output 350 ml   Net 189.47 ml       Labs:  Hematology:  Recent Labs     09/17/22 1810   WBC 7.1   RBC 2.82*   HGB 8.5*   HCT 26.0*   MCV 92.2   MCH 30.1   MCHC 32.7   RDW 12.6      MPV 9.9   SEDRATE 54*   CRP 10.4*     Chemistry:  Recent Labs     09/17/22 1810 09/18/22  0838    141   K 4.2 4.4   * 108*   CO2 25 25   GLUCOSE 62* 116*   BUN 27* 24*   CREATININE 1.46* 1.30*   ANIONGAP 8* 8*   LABGLOM 37* 42*   GFRAA 45* 51*   CALCIUM 8.9 8.8   No results for input(s): PROT, LABALBU, LABA1C, I9ZLHWG, X2DERRW, FT4, TSH, AST, ALT, LDH, GGT, ALKPHOS, LABGGT, BILITOT, BILIDIR, AMMONIA, AMYLASE, LIPASE, LACTATE, CHOL, HDL, LDLCHOLESTEROL, CHOLHDLRATIO, TRIG, VLDL, BWH57BQ, PHENYTOIN, PHENYF, URICACID, POCGLU in the last 72 hours. ABG:No results found for: POCPH, PHART, PH, POCPCO2, SSW6PXE, PCO2, POCPO2, PO2ART, PO2, POCHCO3, NKB4GVK, HCO3, NBEA, PBEA, BEART, BE, THGBART, THB, FKP9UMI, LEAQ5SLX, Z2BNOQNC, O2SAT, FIO2  Lab Results   Component Value Date/Time    SPECIAL L AC 6 ML 09/17/2022 06:10 PM     Lab Results   Component Value Date/Time    CULTURE NO GROWTH 12 HOURS 09/17/2022 06:10 PM       Radiology:  XR FOOT LEFT (MIN 3 VIEWS)    Result Date: 9/17/2022  Osteomyelitis of the tuft of the distal phalanx of the great toe.        Physical Examination:        General appearance:  alert, cooperative and no distress  Mental Status:  oriented to person, place and time and normal affect  Lungs:  clear to auscultation bilaterally, normal effort  Heart:  regular rate and rhythm, no murmur  Abdomen:  soft, nontender, nondistended, normal bowel sounds, no masses, hepatomegaly, splenomegaly  Extremities:  + Dressing left great toe  Skin:  no gross lesions, rashes, induration    Assessment:        Hospital Problems             Last Modified POA    * (Principal) Osteomyelitis ([facility] [address] 9/17/2022 Yes    Hyperlipidemia, unspecified 9/17/2022 Yes    History of CVA (cerebrovascular accident) 9/17/2022 Yes    Asthma 9/17/2022 Yes    Essential hypertension 9/17/2022 Yes    Gastroesophageal reflux disease without esophagitis 9/17/2022 Yes    Iron deficiency anemia secondary to inadequate dietary iron intake 9/17/2022 Yes    Stage 3 chronic kidney disease ([facility] [address] 9/17/2022 Yes    Vitamin D deficiency 9/17/2022 Yes    Congestive heart failure ([facility] [address] 9/17/2022 Yes    Diarrhea 9/17/2022 Yes       Plan:        Continue IV antibiotics cefepime and vancomycin in consultation with ID  DVT prophylaxis  Cardiology will be evaluating for clearance for possible surgery  Podiatry evaluating the patient, she is likely to get surgery of left great toe  Follow cultures and stool study results  Continue blood pressure and cardiac medicines  Monitor labs in morning    Jenae Fonseca MD  9/18/2022  2:26 PM

## 2022-09-19 ENCOUNTER — APPOINTMENT (OUTPATIENT)
Dept: MRI IMAGING | Age: 57
DRG: 314 | End: 2022-09-19
Payer: MEDICARE

## 2022-09-19 PROBLEM — E11.9 DIABETES MELLITUS (HCC): Status: ACTIVE | Noted: 2022-09-19

## 2022-09-19 LAB
ABSOLUTE EOS #: 0.16 K/UL (ref 0–0.44)
ABSOLUTE IMMATURE GRANULOCYTE: 0.01 K/UL (ref 0–0.3)
ABSOLUTE LYMPH #: 1.6 K/UL (ref 1.1–3.7)
ABSOLUTE MONO #: 0.44 K/UL (ref 0.1–1.2)
ANION GAP SERPL CALCULATED.3IONS-SCNC: 7 MMOL/L (ref 9–17)
BASOPHILS # BLD: 0 % (ref 0–2)
BASOPHILS ABSOLUTE: <0.03 K/UL (ref 0–0.2)
BUN BLDV-MCNC: 19 MG/DL (ref 6–20)
BUN/CREAT BLD: 15 (ref 9–20)
C DIFF AG + TOXIN: NEGATIVE
C-REACTIVE PROTEIN: 5 MG/L (ref 0–5)
CALCIUM SERPL-MCNC: 8.9 MG/DL (ref 8.6–10.4)
CAMPYLOBACTER PCR: NORMAL
CHLORIDE BLD-SCNC: 110 MMOL/L (ref 98–107)
CO2: 24 MMOL/L (ref 20–31)
CREAT SERPL-MCNC: 1.31 MG/DL (ref 0.5–0.9)
E COLI ENTEROTOXIGENIC PCR: NORMAL
EOSINOPHILS RELATIVE PERCENT: 3 % (ref 1–4)
GFR AFRICAN AMERICAN: 51 ML/MIN
GFR NON-AFRICAN AMERICAN: 42 ML/MIN
GFR SERPL CREATININE-BSD FRML MDRD: ABNORMAL ML/MIN/{1.73_M2}
GLUCOSE BLD-MCNC: 105 MG/DL (ref 70–99)
HCT VFR BLD CALC: 24.3 % (ref 36.3–47.1)
HEMOGLOBIN: 7.9 G/DL (ref 11.9–15.1)
IMMATURE GRANULOCYTES: 0 %
LYMPHOCYTES # BLD: 34 % (ref 24–43)
MCH RBC QN AUTO: 29.7 PG (ref 25.2–33.5)
MCHC RBC AUTO-ENTMCNC: 32.5 G/DL (ref 28.4–34.8)
MCV RBC AUTO: 91.4 FL (ref 82.6–102.9)
MONOCYTES # BLD: 9 % (ref 3–12)
NRBC AUTOMATED: 0 PER 100 WBC
PDW BLD-RTO: 12.3 % (ref 11.8–14.4)
PLATELET # BLD: 165 K/UL (ref 138–453)
PLESIOMONAS SHIGELLOIDES PCR: NORMAL
PMV BLD AUTO: 9.4 FL (ref 8.1–13.5)
POTASSIUM SERPL-SCNC: 4.5 MMOL/L (ref 3.7–5.3)
PROCALCITONIN: 0.06 NG/ML
RBC # BLD: 2.66 M/UL (ref 3.95–5.11)
SALMONELLA PCR: NORMAL
SEDIMENTATION RATE, ERYTHROCYTE: 31 MM/HR (ref 0–30)
SEG NEUTROPHILS: 52 % (ref 36–65)
SEGMENTED NEUTROPHILS ABSOLUTE COUNT: 2.43 K/UL (ref 1.5–8.1)
SHIGATOXIN GENE PCR: NORMAL
SHIGELLA SP PCR: NORMAL
SODIUM BLD-SCNC: 141 MMOL/L (ref 135–144)
SPECIMEN DESCRIPTION: NORMAL
SPECIMEN DESCRIPTION: NORMAL
VANCOMYCIN RANDOM: 30.9 UG/ML
VIBRIO PCR: NORMAL
WBC # BLD: 4.7 K/UL (ref 3.5–11.3)
YERSINIA ENTEROCOLITICA PCR: NORMAL

## 2022-09-19 PROCEDURE — 85652 RBC SED RATE AUTOMATED: CPT

## 2022-09-19 PROCEDURE — 80048 BASIC METABOLIC PNL TOTAL CA: CPT

## 2022-09-19 PROCEDURE — 86140 C-REACTIVE PROTEIN: CPT

## 2022-09-19 PROCEDURE — 0Y6Q0Z0 DETACHMENT AT LEFT 1ST TOE, COMPLETE, OPEN APPROACH: ICD-10-PCS

## 2022-09-19 PROCEDURE — 36415 COLL VENOUS BLD VENIPUNCTURE: CPT

## 2022-09-19 PROCEDURE — 88304 TISSUE EXAM BY PATHOLOGIST: CPT

## 2022-09-19 PROCEDURE — 2709999900 HC NON-CHARGEABLE SUPPLY: Performed by: PODIATRIST

## 2022-09-19 PROCEDURE — 87070 CULTURE OTHR SPECIMN AEROBIC: CPT

## 2022-09-19 PROCEDURE — 99232 SBSQ HOSP IP/OBS MODERATE 35: CPT | Performed by: INTERNAL MEDICINE

## 2022-09-19 PROCEDURE — 6360000002 HC RX W HCPCS: Performed by: NURSE PRACTITIONER

## 2022-09-19 PROCEDURE — 73718 MRI LOWER EXTREMITY W/O DYE: CPT

## 2022-09-19 PROCEDURE — 85025 COMPLETE CBC W/AUTO DIFF WBC: CPT

## 2022-09-19 PROCEDURE — 2580000003 HC RX 258: Performed by: NURSE PRACTITIONER

## 2022-09-19 PROCEDURE — 3600000002 HC SURGERY LEVEL 2 BASE: Performed by: PODIATRIST

## 2022-09-19 PROCEDURE — 87205 SMEAR GRAM STAIN: CPT

## 2022-09-19 PROCEDURE — 93923 UPR/LXTR ART STDY 3+ LVLS: CPT

## 2022-09-19 PROCEDURE — 2500000003 HC RX 250 WO HCPCS: Performed by: PODIATRIST

## 2022-09-19 PROCEDURE — 2580000003 HC RX 258

## 2022-09-19 PROCEDURE — 84145 PROCALCITONIN (PCT): CPT

## 2022-09-19 PROCEDURE — 6360000002 HC RX W HCPCS

## 2022-09-19 PROCEDURE — 80202 ASSAY OF VANCOMYCIN: CPT

## 2022-09-19 PROCEDURE — 88311 DECALCIFY TISSUE: CPT

## 2022-09-19 PROCEDURE — 3600000012 HC SURGERY LEVEL 2 ADDTL 15MIN: Performed by: PODIATRIST

## 2022-09-19 PROCEDURE — 1200000000 HC SEMI PRIVATE

## 2022-09-19 PROCEDURE — 6370000000 HC RX 637 (ALT 250 FOR IP)

## 2022-09-19 PROCEDURE — 87075 CULTR BACTERIA EXCEPT BLOOD: CPT

## 2022-09-19 PROCEDURE — 6370000000 HC RX 637 (ALT 250 FOR IP): Performed by: NURSE PRACTITIONER

## 2022-09-19 PROCEDURE — 6360000002 HC RX W HCPCS: Performed by: INTERNAL MEDICINE

## 2022-09-19 RX ORDER — BUPIVACAINE HYDROCHLORIDE 5 MG/ML
INJECTION, SOLUTION EPIDURAL; INTRACAUDAL PRN
Status: DISCONTINUED | OUTPATIENT
Start: 2022-09-19 | End: 2022-09-19 | Stop reason: ALTCHOICE

## 2022-09-19 RX ADMIN — PROBIOTIC PRODUCT - TAB 1 TABLET: TAB at 17:51

## 2022-09-19 RX ADMIN — ASPIRIN 81 MG CHEWABLE TABLET 81 MG: 81 TABLET CHEWABLE at 09:21

## 2022-09-19 RX ADMIN — VANCOMYCIN HYDROCHLORIDE 1000 MG: 1 INJECTION, POWDER, LYOPHILIZED, FOR SOLUTION INTRAVENOUS at 19:56

## 2022-09-19 RX ADMIN — CEFEPIME 2000 MG: 2 INJECTION, POWDER, FOR SOLUTION INTRAVENOUS at 22:20

## 2022-09-19 RX ADMIN — ATORVASTATIN CALCIUM 40 MG: 40 TABLET, FILM COATED ORAL at 09:21

## 2022-09-19 RX ADMIN — CARVEDILOL 6.25 MG: 6.25 TABLET, FILM COATED ORAL at 17:51

## 2022-09-19 RX ADMIN — Medication 1 TABLET: at 09:21

## 2022-09-19 RX ADMIN — CYANOCOBALAMIN TAB 1000 MCG 1000 MCG: 1000 TAB at 09:21

## 2022-09-19 RX ADMIN — SODIUM CHLORIDE, PRESERVATIVE FREE 10 ML: 5 INJECTION INTRAVENOUS at 09:22

## 2022-09-19 RX ADMIN — BUMETANIDE 1 MG: 1 TABLET ORAL at 09:21

## 2022-09-19 RX ADMIN — PROBIOTIC PRODUCT - TAB 1 TABLET: TAB at 07:52

## 2022-09-19 RX ADMIN — FOLIC ACID 1 MG: 1 TABLET ORAL at 09:21

## 2022-09-19 RX ADMIN — SODIUM CHLORIDE: 9 INJECTION, SOLUTION INTRAVENOUS at 19:55

## 2022-09-19 RX ADMIN — HEPARIN SODIUM 5000 UNITS: 5000 INJECTION INTRAVENOUS; SUBCUTANEOUS at 06:05

## 2022-09-19 RX ADMIN — CEFEPIME 2000 MG: 2 INJECTION, POWDER, FOR SOLUTION INTRAVENOUS at 10:23

## 2022-09-19 RX ADMIN — PROBIOTIC PRODUCT - TAB 1 TABLET: TAB at 12:54

## 2022-09-19 RX ADMIN — LISINOPRIL 5 MG: 5 TABLET ORAL at 09:21

## 2022-09-19 RX ADMIN — CARVEDILOL 6.25 MG: 6.25 TABLET, FILM COATED ORAL at 07:53

## 2022-09-19 RX ADMIN — FERROUS SULFATE TAB EC 325 MG (65 MG FE EQUIVALENT) 325 MG: 325 (65 FE) TABLET DELAYED RESPONSE at 07:53

## 2022-09-19 RX ADMIN — HEPARIN SODIUM 5000 UNITS: 5000 INJECTION INTRAVENOUS; SUBCUTANEOUS at 22:20

## 2022-09-19 ASSESSMENT — PAIN SCALES - GENERAL
PAINLEVEL_OUTOF10: 0
PAINLEVEL_OUTOF10: 0

## 2022-09-19 ASSESSMENT — ENCOUNTER SYMPTOMS
GASTROINTESTINAL NEGATIVE: 1
RESPIRATORY NEGATIVE: 1

## 2022-09-19 NOTE — PROGRESS NOTES
Transitions of Care Pharmacy Service   Medication Review    The patient's list of current home medications has been reviewed and updated. Source(s) of information: Sade ([address])    Please feel free to call with any questions about this encounter. Thank you. Heydi Paredes [facility]  Transitions of Care Pharmacy Service  Phone:  [phone]  Fax: [phone]      Prior to Admission medications    Medication Sig Start Date End Date Taking?  Authorizing Provider   bumetanide (BUMEX) 1 MG tablet Take 1 tablet by mouth 2 times daily 4/21/22  Yes Historical Provider, MD   aspirin (ASPIRIN 81) 81 MG chewable tablet Take 1 tablet by mouth daily 4/1/22  Yes Historical Provider, MD   ferrous sulfate (IRON 325) 325 (65 Fe) MG tablet Take 325 mg by mouth daily (with breakfast)   Yes Historical Provider, MD   folic acid (FOLVITE) 1 MG tablet Take 1 mg by mouth daily   Yes Historical Provider, MD   lisinopril (PRINIVIL;ZESTRIL) 5 MG tablet Take 5 mg by mouth daily    Historical Provider, MD   atorvastatin (LIPITOR) 40 MG tablet Take 40 mg by mouth daily    Historical Provider, MD   carvedilol (COREG) 6.25 MG tablet Take 6.25 mg by mouth 2 times daily    Historical Provider, MD   Prenatal Vit-Fe Fumarate-FA (PRENATAL COMPLETE PO) Take 1 tablet by mouth daily    Historical Provider, MD   vitamin B-12 (CYANOCOBALAMIN) 1000 MCG tablet Take 1,000 mcg by mouth daily    Historical Provider, MD   Albuterol Sulfate (PROAIR HFA IN) Inhale 2 puffs into the lungs every 6 hours as needed (SOB)    Historical Provider, MD

## 2022-09-19 NOTE — PROGRESS NOTES
Physician Progress Note      Franklin Lopez  CSN #:                  [id number]  :                       1965  ADMIT DATE:       2022 5:31 PM  [address] [place] DATE:  RESPONDING  PROVIDER #:        Heidi Parkinson MD          QUERY TEXT:    Pt admitted with osteomyelitis left foot and has CHF documented. If possible,   please document in progress notes and discharge summary further specificity   regarding the type and acuity of CHF:    The medical record reflects the following:  Risk Factors: HTN, DM , pmh Non ischemic congestive cardiomyopathy, obesity  Clinical Indicators: 3/2022 ECHO normal left ventricle systolic function   without wall motion abnormalities and ejection fraction of 55 to 60%. home   Bumex 1 mg po Q, per cardiology consult note: patient had a history of   nonischemic cardiomyopathy with excellent recovery of her left ventricle   systolic function. Treatment: cardiac clearance for surgery, Bumex 1 mg po , Coreg, asa ,   lisinopril  Options provided:  -- Chronic Systolic CHF/HFrEF  -- Chronic Diastolic CHF/HFpEF  -- Chronic Systolic and Diastolic CHF  -- Other - I will add my own diagnosis  -- Disagree - Not applicable / Not valid  -- Disagree - Clinically unable to determine / Unknown  -- Refer to Clinical Documentation Reviewer    PROVIDER RESPONSE TEXT:    Provider disagreed with this query.   Refer to cardiologist to clarify    Query created by: Concha Lopez on 2022 12:24 PM      Electronically signed by:  Heidi Parkinson MD 2022 5:02 PM

## 2022-09-19 NOTE — PROGRESS NOTES
Podiatry Progress note    Subjective:     Pt seen at bedside for ulcer/osteo of left great toe. Became worse in [facility] recently. No F/C/N/V/CP/HA related at anytime. Wearing sx shoe on the left foot  Labs, xrays, Mri reviewed today    Objective:     Patient Vitals for the past 8 hrs:   BP Temp Temp src Pulse Resp SpO2   22 0723 122/67 98.4 °F (36.9 °C) Oral 62 16 100 %     Average, Min, and Max for last 24 hours Vitals:  TEMPERATURE:  Temp  Av °F (36.7 °C)  Min: 97.5 °F (36.4 °C)  Max: 98.4 °F (36.9 °C)  RESPIRATIONS RANGE: Resp  Av.3  Min: 16  Max: 18  PULSE RANGE: Pulse  Av.8  Min: 55  Max: 76  BLOOD PRESSURE RANGE:  Systolic (48ZKY), AJV:702 , Min:113 , UMD:934   ; Diastolic (13VRD), QJI:84, Min:53, Max:67    PULSE OXIMETRY RANGE: SpO2  Av %  Min: 100 %  Max: 100 %    I/O last 3 completed shifts:   In: 576.2 [I.V.:113.6; IV Piggyback:462.6]  Out: 350 [Stool:350]    Medications    Scheduled Meds:   vancomycin  1,000 mg IntraVENous Q24H    vancomycin (VANCOCIN) intermittent dosing (placeholder)   Other RX Placeholder    heparin (porcine)  5,000 Units SubCUTAneous 3 times per day    sodium chloride flush  5-40 mL IntraVENous 2 times per day    cefepime  2,000 mg IntraVENous Q12H    prenatal vitamin  1 tablet Oral Daily    bumetanide  1 mg Oral Daily    atorvastatin  40 mg Oral Daily    lisinopril  5 mg Oral Daily    vitamin B-12  1,000 mcg Oral Daily    carvedilol  6.25 mg Oral BID WC    lactobacillus  1 tablet Oral TID WC    aspirin  81 mg Oral Daily    ferrous sulfate  325 mg Oral Daily with breakfast    folic acid  1 mg Oral Daily     Continuous Infusions:   sodium chloride 20 mL/hr at 22 0605     PRN Meds:sodium chloride flush, sodium chloride, potassium chloride **OR** potassium alternative oral replacement **OR** potassium chloride, magnesium sulfate, ondansetron **OR** ondansetron, polyethylene glycol, acetaminophen **OR** acetaminophen, albuterol sulfate HFA    Physical exam  Paplable DP, non-palb PT b/l  No overt ischemia. Skin warm to touch to distal digits left foot. Significant callous and loose skin tip of hallux. Upon removal +tracking to distal tuft only. No fluctuance, crepitus. No malodor or purulence distal left hallux  Neg karen sign left leg    Radiograph:  Left foot: Osteolytic changed to distal tuft and distal phalanx. No s/t emphysema  MRI left foot:  Uptake to distal phalanx and to lesser degree prox phalanx. Labs:  reviewed     BMP  Recent Labs     09/19/22  0616      K 4.5   *   CO2 24   BUN 19   CREATININE 1.31*   CALCIUM 8.9      CBC  Recent Labs     09/19/22  0616   WBC 4.7   RBC 2.66*   HGB 7.9*   HCT 24.3*   MCV 91.4   MCH 29.7   MCHC 32.5   RDW 12.3      MPV 9.4   CRP 5.0       Assessment/Plan:     Patient Active Problem List   Diagnosis    Osteomyelitis of great toe (HCC)    Hyperlipidemia, unspecified    History of CVA (cerebrovascular accident)    Asthma    Essential hypertension    Gastroesophageal reflux disease without esophagitis    Iron deficiency anemia secondary to inadequate dietary iron intake    Stage 3 chronic kidney disease (HCC)    Vitamin D deficiency    Congestive heart failure ([facility] [address]    Diarrhea       Plan:   Discussed options. Plan symes amp left hallux under local anesthesia today.   Plan follow up in office later this week      Electronically signed by Obed Arnold DPM  on 9/19/2022 at 2:38 PM

## 2022-09-19 NOTE — PROGRESS NOTES
[facility]  Office: [address], DO, Deb Vick, DO, Madhuri Gurrola, DO, Shanthigoldie Parkser Blood, DO, Sybil Aguirre MD, Barbara Allison MD, Natalya Vergara MD, Jarrod Chapman MD,  Braden Ardon MD, Huma Welch MD, Alvaro Shirley, DO, Abby Palafox MD,  Abel Mckeon MD, Greer Ellis MD, Praneeth Bocanegra DO, Daisy Rivera MD, Baltazar Ahumada MD, Jane Salmeron MD, Collette Hahn, MD, Sangeetha Rosenthal MD, Mo Ramirez MD, Kisha Brennan DO, Ab Menchaca MD, Lucila Paul MD, Deb Lim, CNP,  Yesenia Crawley, CNP, Primo Bolton, CNP, Isiah Kelley, CNP,  Kimberly Chapman, [facility], Taye Hudson, CNP, Pavel Freed, CNP, Tamera Tipton, CNP, Jamia Gonzales, CNP, Lesia Ames, CNP, Asif Pereyra PA-C, Cassandra Morgan, CNS, Jacquelyn Stroud, [facility], Homa Hansen, CNP, Zulema Benito, CNP, Aura Emery, [facility]    Progress Note    9/19/2022    3:12 PM    Name:   Aurelio Sierra  MRN:     [id number]     Acct:      [de-identified]   Room:   [facility] OR Pool/[facility] Day:  2  Admit Date:  9/17/2022  5:31 PM    PCP:   Deandre Gurrola  Code Status:  Full Code    Subjective:     C/C:   Chief Complaint   Patient presents with    Foot Injury     Patient injured big toe on the left foot in [place] and was in the hospital for three days. Patient is in a ortho shoe now and was told to go to the ER when she got home     Interval History Status: . Patient currently getting IV antibiotics for infection of left great toe   IV vancomycin and cefepime  Diarrhea is controlled, C. difficile negative. Had MRI left great toe today  Podiatry evaluating for possible surgery  Brief History:   Patient presents to the emergency room today with complaints of a left great toe infection. Patient states that she origionally injured her left great toe on Wednesday and was visiting her brother in [place].  Patient was admitted to the hospital (9/14/2022-9/17/2022) and left AMA but advised to follow up in ED when she got home. Patient states that her feet and legs have been swollen and she developed a \"bump\" on her left great toe. Wednesday night the lights were turned off and she hit her toe going up the stairs and felt fluid come from her toe. When she turned the lights on to evaluate her toe, she noticed that there was a mixture of blood and purulent drainage coming from her toe. Patient was admitted and discharged on Doxycycline and advised to follow up. Patient also states that she is experiencing diarrhea from all of the antibiotics. Patient denies any fevers, chills, chest pain, shortness of breath, nausea and vomiting. Patient has a significant past medical history of CHF, CKD, DM, HLD, HTN, LUIS, CVA, and GERD. Throughout the emergency room evaluation it was noted that her chloride level is 109. BUN 27. Creat 1.46. GFR 45. CRP 10.4. Hemoglobin 8.5. Sed rate 54. An Xray was obtained of her left foot which shows: Osteomyelitis of the tuft of the distal phalanx of the great toe. Patient has history of diabetes but currently controlled by diet and weight loss after sleeve gastrectomy      Review of Systems:     Constitutional:  negative for chills, fevers, sweats  Respiratory:  negative for cough, dyspnea on exertion, shortness of breath, wheezing  Cardiovascular:  negative for chest pain, chest pressure/discomfort, lower extremity edema, palpitations  Gastrointestinal:  negative for abdominal pain, constipation, diarrhea, denies nausea, vomiting  Neurological:  negative for dizziness, headache    Medications:      Allergies:  No Known Allergies    Current Meds:   Scheduled Meds:    [MAR Hold] vancomycin  1,000 mg IntraVENous Q24H    [MAR Hold] vancomycin (VANCOCIN) intermittent dosing (placeholder)   Other RX Placeholder    [MAR Hold] heparin (porcine)  5,000 Units SubCUTAneous 3 times per day    [MAR Hold] sodium chloride flush  5-40 mL IntraVENous 2 times per day    [MAR Hold] cefepime  2,000 mg IntraVENous Q12H    [MAR Hold] prenatal vitamin  1 tablet Oral Daily    [MAR Hold] bumetanide  1 mg Oral Daily    [MAR Hold] atorvastatin  40 mg Oral Daily    [MAR Hold] lisinopril  5 mg Oral Daily    [MAR Hold] vitamin B-12  1,000 mcg Oral Daily    [MAR Hold] carvedilol  6.25 mg Oral BID WC    [MAR Hold] lactobacillus  1 tablet Oral TID WC    [MAR Hold] aspirin  81 mg Oral Daily    [MAR Hold] ferrous sulfate  325 mg Oral Daily with breakfast    [MAR Hold] folic acid  1 mg Oral Daily     Continuous Infusions:    [MAR Hold] sodium chloride 20 mL/hr at 09/18/22 0605     PRN Meds: [MAR Hold] sodium chloride flush, [MAR Hold] sodium chloride, [MAR Hold] potassium chloride **OR** [MAR Hold] potassium alternative oral replacement **OR** [MAR Hold] potassium chloride, [MAR Hold] magnesium sulfate, [MAR Hold] ondansetron **OR** [MAR Hold] ondansetron, [MAR Hold] polyethylene glycol, [MAR Hold] acetaminophen **OR** [MAR Hold] acetaminophen, [MAR Hold] albuterol sulfate HFA    Data:     Past Medical History:   has a past medical history of Arthritis of big toe, Asthma, CHF (congestive heart failure) ([facility] [address] CKD (chronic kidney disease), stage III ([facility] [address] Diabetes mellitus ([facility] [address] History of anemia, History of blood transfusion, Hyperlipidemia, Hypertension, Left ventricular systolic dysfunction, Neuropathy, Nonischemic congestive cardiomyopathy ([facility] [address] and Obesity. Social History:   reports that she has never smoked. She has never used smokeless tobacco. She reports that she does not currently use alcohol. She reports that she does not use drugs.      Family History:   Family History   Problem Relation Age of Onset    High Cholesterol Mother     Diabetes Mother     High Cholesterol Father     Diabetes Father     Glaucoma Father     High Cholesterol Brother     Diabetes Brother     Heart Disease Brother     Kidney Disease Brother     Blindness Maternal Grandfather     Cataracts Maternal Grandfather     Glaucoma Paternal Grandfather        Vitals:  /67   Pulse 62   Temp 98.4 °F (36.9 °C) (Oral)   Resp 16   Ht 5' 11\" (1.803 m)   Wt 213 lb (96.6 kg)   SpO2 100%   BMI 29.71 kg/m²   Temp (24hrs), Av °F (36.7 °C), Min:97.5 °F (36.4 °C), Max:98.4 °F (36.9 °C)    No results for input(s): POCGLU in the last 72 hours. I/O (24Hr): Intake/Output Summary (Last 24 hours) at 2022 1512  Last data filed at 2022 1200  Gross per 24 hour   Intake 516.7 ml   Output --   Net 516.7 ml         Labs:  Hematology:  Recent Labs     22  1810 22  0616   WBC 7.1 4.7   RBC 2.82* 2.66*   HGB 8.5* 7.9*   HCT 26.0* 24.3*   MCV 92.2 91.4   MCH 30.1 29.7   MCHC 32.7 32.5   RDW 12.6 12.3    165   MPV 9.9 9.4   SEDRATE 54* 31*   CRP 10.4* 5.0       Chemistry:  Recent Labs     22  18122  0838 22  0616    141 141   K 4.2 4.4 4.5   * 108* 110*   CO2 25 25 24   GLUCOSE 62* 116* 105*   BUN 27* 24* 19   CREATININE 1.46* 1.30* 1.31*   ANIONGAP 8* 8* 7*   LABGLOM 37* 42* 42*   GFRAA 45* 51* 51*   CALCIUM 8.9 8.8 8.9     No results for input(s): PROT, LABALBU, LABA1C, Q7SZBQT, L8PGMMG, FT4, TSH, AST, ALT, LDH, GGT, ALKPHOS, LABGGT, BILITOT, BILIDIR, AMMONIA, AMYLASE, LIPASE, LACTATE, CHOL, HDL, LDLCHOLESTEROL, CHOLHDLRATIO, TRIG, VLDL, KBA57WP, PHENYTOIN, PHENYF, URICACID, POCGLU in the last 72 hours. ABG:No results found for: POCPH, PHART, PH, POCPCO2, PGK4UMP, PCO2, POCPO2, PO2ART, PO2, POCHCO3, IBE3YAP, HCO3, NBEA, PBEA, BEART, BE, THGBART, THB, QIJ2ZKN, FMPW7DWH, L5ISQERV, O2SAT, FIO2  Lab Results   Component Value Date/Time    SPECIAL L AC 6 ML 2022 06:10 PM     Lab Results   Component Value Date/Time    CULTURE CULTURE IN PROGRESS 2022 08:27 AM       Radiology:  XR FOOT LEFT (MIN 3 VIEWS)    Result Date: 2022  Osteomyelitis of the tuft of the distal phalanx of the great toe.        Physical Examination:        General appearance:  alert, cooperative and no distress  Mental Status:  oriented to person, place and time and normal affect  Lungs:  clear to auscultation bilaterally, normal effort  Heart:  regular rate and rhythm, no murmur  Abdomen:  soft, nontender, nondistended, normal bowel sounds, no masses, hepatomegaly, splenomegaly  Extremities:  + Dressing left great toe  Skin:  no gross lesions, rashes, induration    Assessment:        Hospital Problems             Last Modified POA    * (Principal) Osteomyelitis of great toe ([address] 9/18/2022 Yes    Hyperlipidemia, unspecified 9/17/2022 Yes    History of CVA (cerebrovascular accident) 9/17/2022 Yes    Asthma 9/17/2022 Yes    Essential hypertension 9/17/2022 Yes    Gastroesophageal reflux disease without esophagitis 9/17/2022 Yes    Iron deficiency anemia secondary to inadequate dietary iron intake 9/17/2022 Yes    Stage 3 chronic kidney disease ([address] 9/17/2022 Yes    Vitamin D deficiency 9/17/2022 Yes    Congestive heart failure ([address] 9/17/2022 Yes    Diarrhea 9/17/2022 Yes   history of diabetes but currently controlled by diet and weight loss after sleeve gastrectomy  Plan:        Continue IV antibiotics cefepime and vancomycin in consultation with ID  DVT prophylaxis  Cardiology cleared for possible surgery  Podiatry evaluating the patient, she is likely to get surgery of left great toe after reviewing MRI results today  Follow cultures study results  Continue blood pressure and cardiac medicines  Monitor labs in morning    Zuleika Vee MD  9/19/2022  3:12 PM

## 2022-09-19 NOTE — PROGRESS NOTES
alert in no acute distress. The patient does have swelling in the left leg. She no longer has any diarrhea at this point in time. The patient did undergo MRI imaging as well as arterial Doppler studies of the lower extremity today. Review of Systems   Constitutional: Negative. Respiratory: Negative. Cardiovascular:  Positive for leg swelling. Gastrointestinal: Negative. Genitourinary: Negative. Musculoskeletal: Negative. Skin:  Positive for wound. Neurological: Negative. Psychiatric/Behavioral: Negative. Physical Examination :     Physical Exam  Constitutional:       Appearance: She is well-developed. HENT:      Head: Normocephalic and atraumatic. Cardiovascular:      Rate and Rhythm: Regular rhythm. Heart sounds: Normal heart sounds. Pulmonary:      Effort: Pulmonary effort is normal.      Breath sounds: Normal breath sounds. Abdominal:      General: Bowel sounds are normal.      Palpations: Abdomen is soft. Musculoskeletal:      Cervical back: Normal range of motion and neck supple. Right lower leg: Edema present. Left lower leg: Edema present. Skin:     General: Skin is warm and dry. Comments: There is a dressing in the left foot which was not removed   Neurological:      Mental Status: She is alert and oriented to person, place, and time. Laboratory data:   I have independently reviewed the followinglabs:  CBC with Differential:   Recent Labs     09/17/22  1810 09/19/22  0616   WBC 7.1 4.7   HGB 8.5* 7.9*   HCT 26.0* 24.3*    165   LYMPHOPCT 34 34   MONOPCT 10 9     BMP:   Recent Labs     09/18/22  0838 09/19/22  0616    141   K 4.4 4.5   * 110*   CO2 25 24   BUN 24* 19   CREATININE 1.30* 1.31*     Hepatic Function Panel: No results for input(s): PROT, LABALBU, BILIDIR, IBILI, BILITOT, ALKPHOS, ALT, AST in the last 72 hours.       No results found for: PROCAL  Lab Results   Component Value Date/Time    CRP 5.0 09/19/2022 06:16 AM    CRP 10.4 09/17/2022 06:10 PM     Lab Results   Component Value Date    SEDRATE 31 (H) 09/19/2022         No results found for: DDIMER  No results found for: FERRITIN  No results found for: LDH  No results found for: FIBRINOGEN    No results found for requested labs within last 30 days. Lab Results   Component Value Date/Time    COVID19 Not Detected 12/14/2020 09:17 AM       No results for input(s): VANCALEXIAOUGH in the last 72 hours. Imaging Studies:   MRI OF THE LEFT FOOT WITHOUT CONTRAST, 9/19/2022 9:38 am  Impression   Osteomyelitis involving the 1st digit distal phalanx and the distal aspect of   the 1st digit proximal phalanx. Small effusions at the 2nd through 4th MTP joints. Small volume intermetatarsal bursitis at the 1st and 3rd intermetatarsal   spaces. Diffuse edema and skin thickening involving the dorsum of the mid and   forefoot suggestive of cellulitis. Arterial study pending      Cultures:     Gastrointestinal Panel, Molecular [[id number]] Collected: 09/17/22 2250   Order Status: Completed Specimen: Stool Updated: 09/19/22 1003    Specimen Description . FECES    Campylobacter PCR NEGATIVE: No Campylobacter spp. (jejuni or coli) DNA Detected    Salmonella PCR NEGATIVE: No Salmonella spp.  DNA Detected    Shigatoxin Gene PCR NEGATIVE: No Shiga toxin-producing gene(s) Detected    Shigella Sp PCR NEGATIVE: No Shigella spp. / EIEC DNA Detected    Plesiomonas Shigelloides PCR NEGATIVE: No Plesionomas shigelloides DNA Detected    Vibrio PCR NEGATIVE: No Vibrio (V. vulnificus, V, parahaemolyticus and V. cholerae) DNA Detected    E Coli Enterotoxigenic PCR NEGATIVE: No Enterotoxigenic E. coli (ETEC) Heat-labile and heat-stable (LT/ST) DNA Detected    Yersinia Enterocolitica PCR NEGATIVE: No Yersinia enterocolitica DNA Detected   C DIFF TOXIN/ANTIGEN [[id number]] Collected: 09/17/22 2250   Order Status: Completed Specimen: Stool Updated: 09/19/22 0834    Specimen Description . FECES    C DIFF AG + TOXIN NEGATIVE    Comment: No C. difficile antigen and Toxin Detected. Blood Culture 1 [[id number]] Collected: 09/17/22 1810   Order Status: Completed Specimen: Blood Updated: 09/18/22 2120    Specimen Description . BLOOD    Special Requests L AC 6 ML    Culture NO GROWTH 1 DAY   Culture, Blood 2 [[id number]] Collected: 09/17/22 1800   Order Status: Completed Specimen: Blood Updated: 09/18/22 2119    Specimen Description . BLOOD    Special Requests R AC 8 ML    Culture NO GROWTH 1 DAY   Culture, Anaerobic and Aerobic [[id number]] (Abnormal) Collected: 09/18/22 0827   Order Status: Completed Specimen: Foot Updated: 09/18/22 1538    Specimen Description . FOOT LT TOE    Direct Exam RARE NEUTROPHILS Abnormal      RARE GRAM POSITIVE COCCI IN PAIRS Abnormal     Culture PENDING       Medications:      vancomycin  1,000 mg IntraVENous Q24H    vancomycin (VANCOCIN) intermittent dosing (placeholder)   Other RX Placeholder    heparin (porcine)  5,000 Units SubCUTAneous 3 times per day    sodium chloride flush  5-40 mL IntraVENous 2 times per day    cefepime  2,000 mg IntraVENous Q12H    prenatal vitamin  1 tablet Oral Daily    bumetanide  1 mg Oral Daily    atorvastatin  40 mg Oral Daily    lisinopril  5 mg Oral Daily    vitamin B-12  1,000 mcg Oral Daily    carvedilol  6.25 mg Oral BID WC    lactobacillus  1 tablet Oral TID WC    aspirin  81 mg Oral Daily    ferrous sulfate  325 mg Oral Daily with breakfast    folic acid  1 mg Oral Daily           [facility]  Lesly Mesa MD  Perfect Serve messaging  OFFICE: [phone]      Electronically signed by Lesly Mesa MD on 9/19/2022 at 12:07 PM  Thank you for allowing us to participate in the care of this patient. Please call with questions.     This note iscreated with the assistance of a speech recognition program.  While intending to generate a document that actually reflects the content of the visit, the document can still have some errors including those of syntax andsound a like substitutions which may escape proof reading. In such instances, actual meaning can be extrapolated by contextual diversion.

## 2022-09-19 NOTE — PROGRESS NOTES
Progress Note  Podiatric Medicine and Surgery     Subjective     Chief Complaint: Left great toe injury    Interval history:  Pt seen at bedside right before being transported for MRI  Pt denies any complaints at this time  Dressings changed by nursing    HPI:  Dilma Vick is a 65 y.o. female seen at [facility] for left great toe injury. He states that the wound developed while she was on vacation in [place] last week she flew home today and got admitted as she was in ED and nongranular and was started on vancomycin. She was told that she should visit her podiatrist once she gets [place] to [place] given the condition of her great toe. At this time she rates her pain 0 out of 10. She has a history of cardiac and vascular issues. Patient denies any other pedal complaints at this time    PCP is [address] [facility]     Past Medical History   has a past medical history of Arthritis of big toe, Asthma, CHF (congestive heart failure) ([address] CKD (chronic kidney disease), stage III ([address] Diabetes mellitus ([address] History of anemia, History of blood transfusion, Hyperlipidemia, Hypertension, Left ventricular systolic dysfunction, Neuropathy, Nonischemic congestive cardiomyopathy ([address] and Obesity. Past Surgical History   has a past surgical history that includes  section; Sleeve Gastrectomy (10/07/2020); Cardiac catheterization (2020); Facial cosmetic surgery (); Foot surgery (Right); toenail excision (Bilateral); Toe Surgery (Left, 2020); and arthrodesis (Left, 2020). Medications  Prior to Admission medications    Medication Sig Start Date End Date Taking?  Authorizing Provider   bumetanide (BUMEX) 1 MG tablet Take 1 tablet by mouth daily 22  Yes Historical Provider, MD   aspirin (ASPIRIN 81) 81 MG chewable tablet Take 1 tablet by mouth daily 22  Yes Historical Provider, MD   ferrous sulfate (IRON 325) 325 (65 Fe) MG tablet Take 325 mg by mouth daily (with breakfast)   Yes Historical Provider, MD   folic acid (FOLVITE) 1 MG tablet Take 1 mg by mouth daily   Yes Historical Provider, MD   lisinopril (PRINIVIL;ZESTRIL) 5 MG tablet Take 5 mg by mouth daily    Historical Provider, MD   atorvastatin (LIPITOR) 40 MG tablet Take 40 mg by mouth daily    Historical Provider, MD   carvedilol (COREG) 6.25 MG tablet Take 6.25 mg by mouth 2 times daily Pt states she does not know the dose but takes a half a pil BID    Historical Provider, MD   Prenatal Vit-Fe Fumarate-FA (PRENATAL COMPLETE PO) Take 1 tablet by mouth daily    Historical Provider, MD   vitamin B-12 (CYANOCOBALAMIN) 1000 MCG tablet Take 1,000 mcg by mouth daily    Historical Provider, MD   Albuterol Sulfate (PROAIR HFA IN) Inhale 2 puffs into the lungs every 6 hours as needed (SOB)    Historical Provider, MD    Scheduled Meds:   vancomycin  1,000 mg IntraVENous Q24H    vancomycin (VANCOCIN) intermittent dosing (placeholder)   Other RX Placeholder    heparin (porcine)  5,000 Units SubCUTAneous 3 times per day    sodium chloride flush  5-40 mL IntraVENous 2 times per day    cefepime  2,000 mg IntraVENous Q12H    prenatal vitamin  1 tablet Oral Daily    bumetanide  1 mg Oral Daily    atorvastatin  40 mg Oral Daily    lisinopril  5 mg Oral Daily    vitamin B-12  1,000 mcg Oral Daily    carvedilol  6.25 mg Oral BID WC    lactobacillus  1 tablet Oral TID WC    aspirin  81 mg Oral Daily    ferrous sulfate  325 mg Oral Daily with breakfast    folic acid  1 mg Oral Daily     Continuous Infusions:   sodium chloride 20 mL/hr at 09/18/22 0605     PRN Meds:.sodium chloride flush, sodium chloride, potassium chloride **OR** potassium alternative oral replacement **OR** potassium chloride, magnesium sulfate, ondansetron **OR** ondansetron, polyethylene glycol, acetaminophen **OR** acetaminophen, albuterol sulfate HFA    Allergies  has No Known Allergies.     Family History  family history includes Blindness in her maternal grandfather; Cataracts in her maternal grandfather; Diabetes in her brother, father, and mother; Glaucoma in her father and paternal grandfather; Heart Disease in her brother; High Cholesterol in her brother, father, and mother; Kidney Disease in her brother. Social History   reports that she has never smoked. She has never used smokeless tobacco.   reports that she does not currently use alcohol. reports no history of drug use. Objective     Vitals:  Patient Vitals for the past 8 hrs:   BP Temp Temp src Pulse Resp SpO2 Weight   22 0723 122/67 98.4 °F (36.9 °C) Oral 62 16 100 % --   22 0412 128/62 97.7 °F (36.5 °C) -- 55 18 100 % --   22 0329 -- -- -- -- -- -- 213 lb (96.6 kg)       Average, Min, and Max for last 24 hours Vitals:  TEMPERATURE:  Temp  Av °F (36.7 °C)  Min: 97.5 °F (36.4 °C)  Max: 98.4 °F (36.9 °C)    RESPIRATIONS RANGE: Resp  Av.3  Min: 16  Max: 18    PULSE RANGE: Pulse  Av.8  Min: 55  Max: 76    BLOOD PRESSURE RANGE:  Systolic (08CFM), LWS:913 , Min:113 , UCY:122   ; Diastolic (00EVA), DTK:86, Min:53, Max:67      PULSE OXIMETRY RANGE: SpO2  Av %  Min: 100 %  Max: 100 %  I&O:  I/O last 3 completed shifts: In: 576.2 [I.V.:113.6; IV Piggyback:462.6]  Out: 350 [Stool:350]    CBC:  Recent Labs     22  0616   WBC 7.1 4.7   HGB 8.5* 7.9*   HCT 26.0* 24.3*    165   CRP 10.4* 5.0          BMP:  Recent Labs     22  0838 22  0616    141 141   K 4.2 4.4 4.5   * 108* 110*   CO2 25 25 24   BUN 27* 24* 19   CREATININE 1.46* 1.30* 1.31*   GLUCOSE 62* 116* 105*   CALCIUM 8.9 8.8 8.9          Coags:  No results for input(s): APTT, PROT, INR in the last 72 hours.     No results found for: LABA1C  Lab Results   Component Value Date    SEDRATE 54 (H) 2022     Lab Results   Component Value Date    CRP 5.0 2022         Physical Exam:  Vascular: DP and PT pulses are none palpable but slightly audible on Doppler. CFT < 4 seconds  brisk to all digits. Hair growth is absent to the level of the digits. Moderate edema appreciated to the left lower extremity. Neuro: Saph/sural/SP/DP/plantar sensation intact to light touch. Musculoskeletal: Muscle strength is 4/5 to all lower extremity muscle groups. Compartments are soft and compressible. No pain with compression of posterior calf. Dermatologic: Full thickness ulcer #1 noted to the dorsal aspect of the left great toe and measures approximately 2cm x 0.8 x 1cm. Base is fibrogranular. Periwound skin is macerated with fluid underneath. Drainage noted with no associated mal odor. Mild erythema appreciated with no warmth. Does not probe to bone, sinus track, or undermine. No fluctuance, crepitus, or induration. Interdigital maceration absent. Clinical:   See media panel    Imaging:   XR FOOT LEFT (MIN 3 VIEWS)   Final Result   Osteomyelitis of the tuft of the distal phalanx of the great toe. MRI FOOT LEFT WO CONTRAST    (Results Pending)   VL Arterial PVR Lower    (Results Pending)       Cultures: Cultures taken pending results    Assessment     Eduardo Ham is a 65 y.o. female with   Full-thickness ulceration down to level of subcutaneous, left foot  Osteomyelitis, left foot  History of CVA  Hypertension  CHF  PVD    Principal Problem:    Osteomyelitis of great toe (HCC)  Active Problems:    Hyperlipidemia, unspecified    History of CVA (cerebrovascular accident)    Asthma    Essential hypertension    Gastroesophageal reflux disease without esophagitis    Iron deficiency anemia secondary to inadequate dietary iron intake    Stage 3 chronic kidney disease (HCC)    Vitamin D deficiency    Congestive heart failure ([facility]    Diarrhea  Resolved Problems:    * No resolved hospital problems. *        Plan     Patient examined and evaluated at bedside. Treatment options discussed in detail with the patient.   Plain film radiographs ordered. Positive for osteomyelitis. MRI ordered to understand the extend of osteomyelitis. Pending results  Radiographs reviewed and discussed in detail with the patient. Medical management per Internal medicine. ID consulted. Abx: Cefepime and vancomycin  Cultures obtained and sent to lab  Vascular surgery will be consulted after we get PVR results  NIVS ordered due to diminished peripheral pulses. Pending results  Cardiology consulted for cardiac clearance for surgical intervention. Stable for surgery from cardiology standpoint per cardiology  Dressing applied to Left foot by nursing today: plain packing, DSD and tape  WBAT to heel Left lower extremity. Will discuss with Dr. Analilia Shepherd. Mendel Plater, ANA LILIA   Podiatric Medicine & Surgery   9/19/2022 at 9:41 AM      This note is created with the assistance of a speech recognition program.  While intending to generate a document that actually reflects the content of the visit, the document can still have some errors including those of syntax and sound a like substitutions which may escape proof reading. In such instances, actual meaning can be extrapolated by contextual diversion.

## 2022-09-19 NOTE — CONSULTS
[address] [facility] Pharmacokinetic Monitoring Service - Vancomycin     Tamica Richmond is a 65 y.o. female starting on vancomycin therapy for osteomyelitis. Pharmacy consulted by Provider: Dillan Abrams for monitoring and adjustment. Target Concentration: Goal AUC/MARIIA 400-600 mg*hr/L    Additional Antimicrobials: cefepime    Pertinent Laboratory Values: Wt Readings from Last 1 Encounters:   09/19/22 213 lb (96.6 kg)     Temp Readings from Last 1 Encounters:   09/19/22 98.4 °F (36.9 °C) (Oral)     Estimated Creatinine Clearance: 61 mL/min (A) (based on SCr of 1.31 mg/dL (H)). Recent Labs     09/17/22  1810 09/18/22  0838 09/19/22  0616   CREATININE 1.46* 1.30* 1.31*   WBC 7.1  --  4.7     Procalcitonin: ---    Pertinent Cultures:  Culture Date Source Results   9/17/22 Blood x2 No growth x 1 day     MRSA Nasal Swab: N/A. Non-respiratory infection. Plan:  Level drawn 9/19/22 06:16 was 30.9 mcg/mL which is supra-therapeutic.     Adjust dosing to 1000 mg q24h   Renal labs as indicated   Vancomycin concentration ordered for 9/21 @ 0600   Pharmacy will continue to monitor patient and adjust therapy as indicated    Thank you for the consult,  Steve Dawson, [facility]  9/19/2022 8:54 AM

## 2022-09-19 NOTE — PLAN OF CARE
Problem: Skin/Tissue Integrity - Adult  Goal: Skin integrity remains intact  9/18/2022 2324 by Madeleine Castillo RN  Outcome: Progressing     Problem: Skin/Tissue Integrity - Adult  Goal: Incisions, wounds, or drain sites healing without S/S of infection  9/18/2022 2324 by Madeleine Castillo RN  Outcome: Progressing     Problem: Infection - Adult  Goal: Absence of infection at discharge  9/18/2022 2322 by Madeleine Castillo RN  Outcome: Progressing     Problem: Musculoskeletal - Adult  Goal: Return mobility to safest level of function  9/18/2022 2324 by Madeleine Castillo RN  Outcome: Progressing     Problem: Musculoskeletal - Adult  Goal: Return ADL status to a safe level of function  Outcome: Progressing     Problem: Metabolic/Fluid and Electrolytes - Adult  Goal: Electrolytes maintained within normal limits  Outcome: Progressing     Problem: Metabolic/Fluid and Electrolytes - Adult  Goal: Hemodynamic stability and optimal renal function maintained  Outcome: Progressing     Problem: Chronic Conditions and Co-morbidities  Goal: Patient's chronic conditions and co-morbidity symptoms are monitored and maintained or improved  9/18/2022 2322 by Madeleine Castillo RN  Outcome: Progressing  9/18/2022 1747 by Keisha Montes RN  Outcome: Progressing  Flowsheets (Taken 9/18/2022 1747)  Care Plan - Patient's Chronic Conditions and Co-Morbidity Symptoms are Monitored and Maintained or Improved: Monitor and assess patient's chronic conditions and comorbid symptoms for stability, deterioration, or improvement  Note: Progressing     Problem: Gastrointestinal - Adult  Goal: Maintains or returns to baseline bowel function  Outcome: Progressing

## 2022-09-19 NOTE — PLAN OF CARE
Problem: Safety - Adult  Goal: Free from fall injury  9/19/2022 1232 by Michelle Nieto RN  Outcome: Progressing  Note: Patient remains from falls  Bed in lowest position, call light within reach, hourly rounding, fallen star.  Non skid slippers  9/19/2022 1230 by Michelle Nieto RN  Note: Patient remains free from falls

## 2022-09-20 VITALS
HEIGHT: 71 IN | TEMPERATURE: 98.1 F | HEART RATE: 66 BPM | OXYGEN SATURATION: 100 % | RESPIRATION RATE: 16 BRPM | SYSTOLIC BLOOD PRESSURE: 130 MMHG | DIASTOLIC BLOOD PRESSURE: 67 MMHG | BODY MASS INDEX: 29.86 KG/M2 | WEIGHT: 213.3 LBS

## 2022-09-20 PROBLEM — I50.32 CHRONIC DIASTOLIC CONGESTIVE HEART FAILURE (HCC): Status: ACTIVE | Noted: 2022-09-17

## 2022-09-20 LAB
ABSOLUTE EOS #: 0.1 K/UL (ref 0–0.44)
ABSOLUTE IMMATURE GRANULOCYTE: 0.02 K/UL (ref 0–0.3)
ABSOLUTE LYMPH #: 1.37 K/UL (ref 1.1–3.7)
ABSOLUTE MONO #: 0.52 K/UL (ref 0.1–1.2)
ANION GAP SERPL CALCULATED.3IONS-SCNC: 6 MMOL/L (ref 9–17)
BASOPHILS # BLD: 0 % (ref 0–2)
BASOPHILS ABSOLUTE: <0.03 K/UL (ref 0–0.2)
BUN BLDV-MCNC: 21 MG/DL (ref 6–20)
BUN/CREAT BLD: 16 (ref 9–20)
CALCIUM SERPL-MCNC: 8.5 MG/DL (ref 8.6–10.4)
CHLORIDE BLD-SCNC: 109 MMOL/L (ref 98–107)
CO2: 24 MMOL/L (ref 20–31)
CREAT SERPL-MCNC: 1.3 MG/DL (ref 0.5–0.9)
EOSINOPHILS RELATIVE PERCENT: 2 % (ref 1–4)
GFR AFRICAN AMERICAN: 51 ML/MIN
GFR NON-AFRICAN AMERICAN: 42 ML/MIN
GFR SERPL CREATININE-BSD FRML MDRD: ABNORMAL ML/MIN/{1.73_M2}
GLUCOSE BLD-MCNC: 113 MG/DL (ref 70–99)
HCT VFR BLD CALC: 21.9 % (ref 36.3–47.1)
HCT VFR BLD CALC: 24.5 % (ref 36.3–47.1)
HEMOGLOBIN: 7.2 G/DL (ref 11.9–15.1)
HEMOGLOBIN: 8 G/DL (ref 11.9–15.1)
IMMATURE GRANULOCYTES: 0 %
LYMPHOCYTES # BLD: 25 % (ref 24–43)
MCH RBC QN AUTO: 29.8 PG (ref 25.2–33.5)
MCHC RBC AUTO-ENTMCNC: 32.9 G/DL (ref 28.4–34.8)
MCV RBC AUTO: 90.5 FL (ref 82.6–102.9)
MONOCYTES # BLD: 10 % (ref 3–12)
NRBC AUTOMATED: 0 PER 100 WBC
PDW BLD-RTO: 12.4 % (ref 11.8–14.4)
PLATELET # BLD: 162 K/UL (ref 138–453)
PMV BLD AUTO: 10 FL (ref 8.1–13.5)
POTASSIUM SERPL-SCNC: 4.7 MMOL/L (ref 3.7–5.3)
RBC # BLD: 2.42 M/UL (ref 3.95–5.11)
SEG NEUTROPHILS: 63 % (ref 36–65)
SEGMENTED NEUTROPHILS ABSOLUTE COUNT: 3.45 K/UL (ref 1.5–8.1)
SODIUM BLD-SCNC: 139 MMOL/L (ref 135–144)
WBC # BLD: 5.5 K/UL (ref 3.5–11.3)

## 2022-09-20 PROCEDURE — 85014 HEMATOCRIT: CPT

## 2022-09-20 PROCEDURE — 6370000000 HC RX 637 (ALT 250 FOR IP)

## 2022-09-20 PROCEDURE — 99232 SBSQ HOSP IP/OBS MODERATE 35: CPT | Performed by: INTERNAL MEDICINE

## 2022-09-20 PROCEDURE — 80048 BASIC METABOLIC PNL TOTAL CA: CPT

## 2022-09-20 PROCEDURE — 6360000002 HC RX W HCPCS

## 2022-09-20 PROCEDURE — 36415 COLL VENOUS BLD VENIPUNCTURE: CPT

## 2022-09-20 PROCEDURE — 85018 HEMOGLOBIN: CPT

## 2022-09-20 PROCEDURE — 2580000003 HC RX 258

## 2022-09-20 PROCEDURE — 85025 COMPLETE CBC W/AUTO DIFF WBC: CPT

## 2022-09-20 PROCEDURE — 99239 HOSP IP/OBS DSCHRG MGMT >30: CPT | Performed by: NURSE PRACTITIONER

## 2022-09-20 RX ORDER — OXYCODONE HYDROCHLORIDE AND ACETAMINOPHEN 5; 325 MG/1; MG/1
1 TABLET ORAL EVERY 8 HOURS PRN
Qty: 9 TABLET | Refills: 0 | Status: SHIPPED | OUTPATIENT
Start: 2022-09-20 | End: 2022-09-23

## 2022-09-20 RX ORDER — DOXYCYCLINE HYCLATE 100 MG
100 TABLET ORAL 2 TIMES DAILY
Qty: 28 TABLET | Refills: 0 | Status: SHIPPED | OUTPATIENT
Start: 2022-09-20 | End: 2022-10-04

## 2022-09-20 RX ORDER — LEVOFLOXACIN 500 MG/1
500 TABLET, FILM COATED ORAL DAILY
Qty: 14 TABLET | Refills: 0 | Status: SHIPPED | OUTPATIENT
Start: 2022-09-20 | End: 2022-10-04

## 2022-09-20 RX ADMIN — HEPARIN SODIUM 5000 UNITS: 5000 INJECTION INTRAVENOUS; SUBCUTANEOUS at 04:48

## 2022-09-20 RX ADMIN — ACETAMINOPHEN 650 MG: 325 TABLET, FILM COATED ORAL at 04:48

## 2022-09-20 RX ADMIN — BUMETANIDE 1 MG: 1 TABLET ORAL at 09:38

## 2022-09-20 RX ADMIN — CEFEPIME 2000 MG: 2 INJECTION, POWDER, FOR SOLUTION INTRAVENOUS at 09:42

## 2022-09-20 RX ADMIN — PROBIOTIC PRODUCT - TAB 1 TABLET: TAB at 17:00

## 2022-09-20 RX ADMIN — FERROUS SULFATE TAB EC 325 MG (65 MG FE EQUIVALENT) 325 MG: 325 (65 FE) TABLET DELAYED RESPONSE at 07:59

## 2022-09-20 RX ADMIN — ASPIRIN 81 MG CHEWABLE TABLET 81 MG: 81 TABLET CHEWABLE at 09:38

## 2022-09-20 RX ADMIN — PROBIOTIC PRODUCT - TAB 1 TABLET: TAB at 07:59

## 2022-09-20 RX ADMIN — CARVEDILOL 6.25 MG: 6.25 TABLET, FILM COATED ORAL at 07:59

## 2022-09-20 RX ADMIN — Medication 1 TABLET: at 12:00

## 2022-09-20 RX ADMIN — CYANOCOBALAMIN TAB 1000 MCG 1000 MCG: 1000 TAB at 09:38

## 2022-09-20 RX ADMIN — ATORVASTATIN CALCIUM 40 MG: 40 TABLET, FILM COATED ORAL at 09:38

## 2022-09-20 RX ADMIN — SODIUM CHLORIDE, PRESERVATIVE FREE 10 ML: 5 INJECTION INTRAVENOUS at 09:39

## 2022-09-20 RX ADMIN — PROBIOTIC PRODUCT - TAB 1 TABLET: TAB at 12:00

## 2022-09-20 RX ADMIN — CARVEDILOL 6.25 MG: 6.25 TABLET, FILM COATED ORAL at 17:00

## 2022-09-20 RX ADMIN — FOLIC ACID 1 MG: 1 TABLET ORAL at 09:38

## 2022-09-20 RX ADMIN — LISINOPRIL 5 MG: 5 TABLET ORAL at 09:39

## 2022-09-20 ASSESSMENT — ENCOUNTER SYMPTOMS
RESPIRATORY NEGATIVE: 1
GASTROINTESTINAL NEGATIVE: 1

## 2022-09-20 NOTE — CONSULTS
[address] [facility] Pharmacokinetic Monitoring Service - Vancomycin     Yesenia Manriquez is a 65 y.o. female starting on vancomycin therapy for osteomyelitis. Pharmacy consulted by Provider: Zuleika Vee for monitoring and adjustment. Target Concentration: Goal AUC/MARIIA 400-600 mg*hr/L    Additional Antimicrobials: cefepime    Pertinent Laboratory Values: Wt Readings from Last 1 Encounters:   09/20/22 213 lb 4.8 oz (96.8 kg)     Temp Readings from Last 1 Encounters:   09/20/22 98.1 °F (36.7 °C) (Oral)     Estimated Creatinine Clearance: 62 mL/min (A) (based on SCr of 1.3 mg/dL (H)). Recent Labs     09/19/22  0616 09/20/22  0622   CREATININE 1.31* 1.30*   WBC 4.7 5.5     Procalcitonin: ---    Pertinent Cultures:  Culture Date Source Results   9/17/22 Blood x2 No growth x 3 days     MRSA Nasal Swab: N/A. Non-respiratory infection. Plan:  Current dosing on target for  and trough 15.2. Continue current dosing.    Renal labs as indicated   Pharmacy will continue to monitor patient and adjust therapy as indicated    Thank you for the consult,  Earl Parker [facility]  9/20/2022 4:02 PM

## 2022-09-20 NOTE — PLAN OF CARE
Problem: Safety - Adult  Goal: Free from fall injury  9/20/2022 1142 by Norman Calero, NOA  Outcome: Progressing  Note: Patient remains free from falls  Bed in lowest position, call light within reach, siderailx2, hourly rounding, non slid slippers  9/19/2022 2148 by Sharonda Dixon RN  Outcome: Progressing  Flowsheets (Taken 9/19/2022 2147)  Free From Fall Injury: Instruct family/caregiver on patient safety

## 2022-09-20 NOTE — PLAN OF CARE
Problem: Discharge Planning  Goal: Discharge to home or other facility with appropriate resources  Outcome: Progressing  Flowsheets (Taken 9/19/2022 1925)  Discharge to home or other facility with appropriate resources:   Identify barriers to discharge with patient and caregiver   Arrange for needed discharge resources and transportation as appropriate   Identify discharge learning needs (meds, wound care, etc)     Problem: Safety - Adult  Goal: Free from fall injury  9/19/2022 2148 by Chacho Serrato RN  Outcome: Progressing  Flowsheets (Taken 9/19/2022 2147)  Free From Fall Injury: Instruct family/caregiver on patient safety     Problem: ABCDS Injury Assessment  Goal: Absence of physical injury  Outcome: Progressing  Flowsheets (Taken 9/19/2022 2147)  Absence of Physical Injury: Implement safety measures based on patient assessment     Problem: Skin/Tissue Integrity - Adult  Goal: Skin integrity remains intact  Outcome: Progressing  Flowsheets  Taken 9/19/2022 2148  Skin Integrity Remains Intact:   Monitor for areas of redness and/or skin breakdown   Assess vascular access sites hourly  Taken 9/19/2022 1925  Skin Integrity Remains Intact:   Monitor for areas of redness and/or skin breakdown   Assess vascular access sites hourly     Problem: Skin/Tissue Integrity - Adult  Goal: Incisions, wounds, or drain sites healing without S/S of infection  Outcome: Progressing  Flowsheets  Taken 9/19/2022 2148  Incisions, Wounds, or Drain Sites Healing Without Sign and Symptoms of Infection: TWICE DAILY: Assess and document dressing/incision, wound bed, drain sites and surrounding tissue  Taken 9/19/2022 1925  Incisions, Wounds, or Drain Sites Healing Without Sign and Symptoms of Infection: TWICE DAILY: Assess and document dressing/incision, wound bed, drain sites and surrounding tissue     Problem: Musculoskeletal - Adult  Goal: Return mobility to safest level of function  Outcome: Progressing  Flowsheets (Taken 9/19/2022 1925)  Return Mobility to Safest Level of Function:   Assess patient stability and activity tolerance for standing, transferring and ambulating with or without assistive devices   Assist with transfers and ambulation using safe patient handling equipment as needed   Ensure adequate protection for wounds/incisions during mobilization   Obtain physical therapy/occupational therapy consults as needed   Instruct patient/family in ordered activity level     Problem: Musculoskeletal - Adult  Goal: Return ADL status to a safe level of function  Outcome: Progressing  Flowsheets (Taken 9/19/2022 1925)  Return ADL Status to a Safe Level of Function:   Administer medication as ordered   Assess activities of daily living deficits and provide assistive devices as needed   Obtain physical therapy/occupational therapy consults as needed   Assist and instruct patient to increase activity and self care as tolerated     Problem: Gastrointestinal - Adult  Goal: Maintains or returns to baseline bowel function  Outcome: Progressing  Flowsheets (Taken 9/19/2022 1925)  Maintains or returns to baseline bowel function: Assess bowel function     Problem: Infection - Adult  Goal: Absence of infection at discharge  9/19/2022 2148 by Dylan Oliveira RN  Outcome: Progressing  Flowsheets (Taken 9/19/2022 1925)  Absence of infection at discharge:   Assess and monitor for signs and symptoms of infection   Monitor lab/diagnostic results   Monitor all insertion sites i.e., indwelling lines, tubes and drains   Administer medications as ordered   Instruct and encourage patient and family to use good hand hygiene technique   Identify and instruct in appropriate isolation precautions for identified infection/condition     Problem: Metabolic/Fluid and Electrolytes - Adult  Goal: Electrolytes maintained within normal limits  Outcome: Progressing  Flowsheets (Taken 9/19/2022 1925)  Electrolytes maintained within normal limits:   Monitor labs and assess patient for signs and symptoms of electrolyte imbalances   Administer electrolyte replacement as ordered   Monitor response to electrolyte replacements, including repeat lab results as appropriate     Problem: Metabolic/Fluid and Electrolytes - Adult  Goal: Hemodynamic stability and optimal renal function maintained  Outcome: Progressing  Flowsheets (Taken 9/19/2022 1925)  Hemodynamic stability and optimal renal function maintained:   Monitor labs and assess for signs and symptoms of volume excess or deficit   Monitor intake, output and patient weight   Monitor urine specific gravity, serum osmolarity and serum sodium as indicated or ordered   Monitor response to interventions for patient's volume status, including labs, urine output, blood pressure (other measures as available)     Problem: Metabolic/Fluid and Electrolytes - Adult  Goal: Glucose maintained within prescribed range  Outcome: Progressing  Flowsheets (Taken 9/19/2022 1925)  Glucose maintained within prescribed range:   Monitor blood glucose as ordered   Assess for signs and symptoms of hyperglycemia and hypoglycemia   Administer ordered medications to maintain glucose within target range     Problem: Chronic Conditions and Co-morbidities  Goal: Patient's chronic conditions and co-morbidity symptoms are monitored and maintained or improved  Outcome: Progressing  Flowsheets (Taken 9/19/2022 1925)  Care Plan - Patient's Chronic Conditions and Co-Morbidity Symptoms are Monitored and Maintained or Improved:   Monitor and assess patient's chronic conditions and comorbid symptoms for stability, deterioration, or improvement   Collaborate with multidisciplinary team to address chronic and comorbid conditions and prevent exacerbation or deterioration

## 2022-09-20 NOTE — PROGRESS NOTES
[facility]  Progress Note    Nilam Batres  MRN: [id number]  Date: 9/20/2022  LOS: 3     Reason for F/U :   Left foot ulcer    Impression :   Left great toe ulceration with associated osteomyelitis of the first digit distal phalanx and distal aspect of the first proximal phalanx  Status post distal Symes amputation of the left hallux/fillet of the left hallux 9/19/2022  Diarrhea-resolved  History of bilateral lower extremity edema  Chronic kidney disease  Peripheral arterial disease    Recommendations: The patient continues on intravenous antimicrobial therapy with cefepime and vancomycin  The surgical cultures are pending and the superficial wound cultures are no growth  Plan is for discharge on oral antimicrobial therapy with Levaquin and doxycycline for 14 days  Continue local wound care    Infection Control Recommendations:   Universal precautions    Discharge Planning:   Patient will need Midline Catheter Insertion/ PICC line Insertion: No  Patient will need: Home IV , [place],  SNF,  LTAC: Undetermined  Patient willneed outpatient wound care: Yes    Medical Decision making / Summary of Stay:   Nilam Batres is a 65y.o.-year-old female sent to the hospital with left great toe swelling and pain associated with wound and drainage for 1 week. The patient also complaining of diarrhea, denied fever or chills, denied nausea or vomiting, no other complaints.   Foot x-ray suggestive of osteomyelitis to the left big toe  The patient was hospitalized in [place] after an injury to the left great toe 9/14/2022 through 9/17/2022 left AMA was discharged on doxycycline and advised to follow-up at the ER  Status post left hallux arthrodesis 12/16/2020    Current evaluation:9/20/2022    /64   Pulse 61   Temp 97.4 °F (36.3 °C) (Oral)   Resp 16   Ht 5' 11\" (1.803 m)   Wt 213 lb 4.8 oz (96.8 kg)   SpO2 100%   BMI 29.75 kg/m²     Temperature Range: Temp: 97.4 °F (36.3 °C) Temp  Av.1 °F (36.7 °C)  Min: 97.4 °F (36.3 °C)  Max: 98.6 °F (37 °C)  The patient is seen and evaluated at bedside she is awake and alert in no acute distress. No subjective fever or chills. No abdominal pain nausea vomiting or diarrhea. The patient was taken to the operating room and and fillet of the left hallux/distal Symes amputation of the left hallux done 2022    Review of Systems   Constitutional: Negative. Respiratory: Negative. Cardiovascular:  Positive for leg swelling. Gastrointestinal: Negative. Genitourinary: Negative. Musculoskeletal: Negative. Skin:  Positive for wound. Neurological: Negative. Psychiatric/Behavioral: Negative. Physical Examination :     Physical Exam  Constitutional:       Appearance: She is well-developed. HENT:      Head: Normocephalic and atraumatic. Cardiovascular:      Rate and Rhythm: Regular rhythm. Heart sounds: Normal heart sounds. Pulmonary:      Effort: Pulmonary effort is normal.      Breath sounds: Normal breath sounds. Abdominal:      General: Bowel sounds are normal.      Palpations: Abdomen is soft. Musculoskeletal:      Cervical back: Normal range of motion and neck supple. Right lower leg: Edema present. Left lower leg: Edema present. Skin:     General: Skin is warm and dry. Comments: There is a dressing in the left foot which was not removed   Neurological:      Mental Status: She is alert and oriented to person, place, and time.        Laboratory data:   I have independently reviewed the followinglabs:  CBC with Differential:   Recent Labs     22  0616 22  0622   WBC 4.7 5.5   HGB 7.9* 7.2*   HCT 24.3* 21.9*    162   LYMPHOPCT 34 25   MONOPCT 9 10       BMP:   Recent Labs     22  0616 22  06    139   K 4.5 4.7   * 109*   CO2 24 24   BUN 19 21*   CREATININE 1.31* 1.30*       Hepatic Function Panel: No results for input(s): PROT, LABALBU, BILIDIR, IBILI, BILITOT, ALKPHOS, ALT, AST in the last 72 hours. Lab Results   Component Value Date/Time    PROCAL 0.06 09/19/2022 06:16 AM     Lab Results   Component Value Date/Time    CRP 5.0 09/19/2022 06:16 AM    CRP 10.4 09/17/2022 06:10 PM     Lab Results   Component Value Date    SEDRATE 31 (H) 09/19/2022         No results found for: DDIMER  No results found for: FERRITIN  No results found for: LDH  No results found for: FIBRINOGEN    No results found for requested labs within last 30 days. Lab Results   Component Value Date/Time    COVID19 Not Detected 12/14/2020 09:17 AM       No results for input(s): VANCOTROUGH in the last 72 hours. Imaging Studies:   MRI OF THE LEFT FOOT WITHOUT CONTRAST, 9/19/2022 9:38 am  Impression   Osteomyelitis involving the 1st digit distal phalanx and the distal aspect of   the 1st digit proximal phalanx. Small effusions at the 2nd through 4th MTP joints. Small volume intermetatarsal bursitis at the 1st and 3rd intermetatarsal   spaces. Diffuse edema and skin thickening involving the dorsum of the mid and   forefoot suggestive of cellulitis. Arterial study pending      Cultures:     Culture, Anaerobic and Aerobic [[id number]] Collected: 09/19/22 1532   Order Status: Completed Specimen: Swab from Toe Updated: 09/20/22 1059    Specimen Description . TOE LEFT    Direct Exam NO NEUTROPHILS SEEN     NO ORGANISMS SEEN    Culture NO GROWTH 16 HOURS   Culture, Anaerobic and Aerobic [[id number]] (Abnormal) Collected: 09/18/22 0827   Order Status: Completed Specimen: Foot Updated: 09/20/22 9352    Specimen Description . FOOT LT TOE    Direct Exam RARE NEUTROPHILS Abnormal      RARE GRAM POSITIVE COCCI IN PAIRS Abnormal     Culture NORMAL SKIN LEONA     No anaerobic organisms isolated at 2 days. Blood Culture 1 [[id number]] Collected: 09/17/22 1810   Order Status: Completed Specimen: Blood Updated: 09/19/22 2120    Specimen Description . BLOOD    Special Requests L AC 6 ML    Culture NO GROWTH 2 DAYS   Culture, Blood 2 [[id number]] Collected: 09/17/22 1800   Order Status: Completed Specimen: Blood Updated: 09/19/22 2119    Specimen Description . BLOOD    Special Requests R AC 8 ML    Culture NO GROWTH 2 DAYS   Gastrointestinal Panel, Molecular [[id number]] Collected: 09/17/22 2250   Order Status: Completed Specimen: Stool Updated: 09/19/22 1003    Specimen Description . FECES    Campylobacter PCR NEGATIVE: No Campylobacter spp. (jejuni or coli) DNA Detected    Salmonella PCR NEGATIVE: No Salmonella spp. DNA Detected    Shigatoxin Gene PCR NEGATIVE: No Shiga toxin-producing gene(s) Detected    Shigella Sp PCR NEGATIVE: No Shigella spp. / EIEC DNA Detected    Plesiomonas Shigelloides PCR NEGATIVE: No Plesionomas shigelloides DNA Detected    Vibrio PCR NEGATIVE: No Vibrio (V. vulnificus, V, parahaemolyticus and V. cholerae) DNA Detected    E Coli Enterotoxigenic PCR NEGATIVE: No Enterotoxigenic E. coli (ETEC) Heat-labile and heat-stable (LT/ST) DNA Detected    Yersinia Enterocolitica PCR NEGATIVE: No Yersinia enterocolitica DNA Detected   C DIFF TOXIN/ANTIGEN [[id number]] Collected: 09/17/22 2250   Order Status: Completed Specimen: Stool Updated: 09/19/22 0834    Specimen Description . FECES    C DIFF AG + TOXIN NEGATIVE    Comment: No C. difficile antigen and Toxin Detected.         Medications:      vancomycin  1,000 mg IntraVENous Q24H    vancomycin (VANCOCIN) intermittent dosing (placeholder)   Other RX Placeholder    heparin (porcine)  5,000 Units SubCUTAneous 3 times per day    sodium chloride flush  5-40 mL IntraVENous 2 times per day    cefepime  2,000 mg IntraVENous Q12H    prenatal vitamin  1 tablet Oral Daily    bumetanide  1 mg Oral Daily    atorvastatin  40 mg Oral Daily    lisinopril  5 mg Oral Daily    vitamin B-12  1,000 mcg Oral Daily    carvedilol  6.25 mg Oral BID WC    lactobacillus  1 tablet Oral TID WC    aspirin  81 mg Oral Daily    ferrous sulfate  325 mg Oral Daily with breakfast    folic acid  1 mg Oral Daily           [facility]  Jean Carlos Blum MD  Perfect [employer] messaging  OFFICE: [phone]      Electronically signed by Jean Carlos Blum MD on 9/20/2022 at 11:39 AM  Thank you for allowing us to participate in the care of this patient. Please call with questions. This note iscreated with the assistance of a speech recognition program.  While intending to generate a document that actually reflects the content of the visit, the document can still have some errors including those of syntax andsound a like substitutions which may escape proof reading. In such instances, actual meaning can be extrapolated by contextual diversion.

## 2022-09-20 NOTE — DISCHARGE SUMMARY
[facility]  Office: [address], DO, Jamey Lucas, DO, Smith Base, DO, Rashid Lundy Blood, DO, Mandy Alejandra MD, July Del Toro MD, Rupert Nolan MD, Maki Villalpando MD,  Moisés Garcia MD, Eve Chang MD, Brooks Casarez, DO, Estella Du MD,  Hai Chen MD, Rafael Galarza MD, Lincoln Hayes, DO, Yeni Guidry MD, Evelin Dodge MD, Geovani Vidal MD, Ivy Romero MD, Leonel Miller MD, Vaughn Gray MD, Lynda Benavides, DO, Gale Mujica MD, Edin Trinidad MD, Yue Zarate, CNP,  Doris Ruth, CNP, Rocco Pappas, DEVIN, Karlee Milan, CNP,  Denver Parks, DNP, Annette Landaverde, CNP, David Herrera, CNP, Fartun Durbin, CNP, Alondra Gold, CNP, Mira Ceballos, CNP, Cheyenne Wong PA-C, Misha Alvarado, CNS, John Hernandez, [facility], Francois Bhatia, CNP, Marcos Dunlap, [facility], Matias Anaya    Discharge Summary     Patient ID: Baltazar Pink  :  1965   MRN: [id number]     ACCOUNT:  [de-identified]   Patient's PCP: Mer Picking Date: 2022   Discharge Date: 2022     Length of Stay: 3  Code Status:  Full Code  Admitting Physician: Adam Davison MD  Discharge Physician: MEGHAN Sesay NP     Active Discharge Diagnoses:     Hospital Problem Lists:  Principal Problem:    Osteomyelitis of great toe (HCC)  Active Problems:    Hyperlipidemia, unspecified    History of CVA (cerebrovascular accident)    Asthma    Essential hypertension    Gastroesophageal reflux disease without esophagitis    Iron deficiency anemia secondary to inadequate dietary iron intake    Stage 3 chronic kidney disease ([facility] [address]    Vitamin D deficiency    Chronic diastolic congestive heart failure ([facility] [address]    Diarrhea  Resolved Problems:    * No resolved hospital problems.  *      Admission Condition:  serious     Discharged Condition: stable    Hospital Stay:     Hospital Course:  Ebonie Hoffman Yancy Tavarez is a 65 y.o. female who was admitted for the management of  Osteomyelitis of great toe ([address] , presented to ER with worsening infection of her left great toe. Imaging revealed osteomyelitis of distal phalanx. Dietary was consulted and she was taken for a partial amputation of her left hallux. Infectious disease was also consulted for antimicrobial selection. Patient was transitioned to oral antibiotics and she was cleared for discharge by podiatry with outpatient follow-up in the office    Significant therapeutic interventions: See above    Significant Diagnostic Studies:   Labs / Micro:  CBC:   Lab Results   Component Value Date/Time    WBC 5.5 09/20/2022 06:22 AM    RBC 2.42 09/20/2022 06:22 AM    HGB 8.0 09/20/2022 12:01 PM    HCT 24.5 09/20/2022 12:01 PM    MCV 90.5 09/20/2022 06:22 AM    MCH 29.8 09/20/2022 06:22 AM    MCHC 32.9 09/20/2022 06:22 AM    RDW 12.4 09/20/2022 06:22 AM     09/20/2022 06:22 AM     BMP:    Lab Results   Component Value Date/Time    GLUCOSE 113 09/20/2022 06:22 AM     09/20/2022 06:22 AM    K 4.7 09/20/2022 06:22 AM     09/20/2022 06:22 AM    CO2 24 09/20/2022 06:22 AM    ANIONGAP 6 09/20/2022 06:22 AM    BUN 21 09/20/2022 06:22 AM    CREATININE 1.30 09/20/2022 06:22 AM    BUNCRER 16 09/20/2022 06:22 AM    CALCIUM 8.5 09/20/2022 06:22 AM    LABGLOM 42 09/20/2022 06:22 AM    GFRAA 51 09/20/2022 06:22 AM    GFR      09/20/2022 06:22 AM     Radiology:  XR FOOT LEFT (MIN 3 VIEWS)    Result Date: 9/17/2022  Osteomyelitis of the tuft of the distal phalanx of the great toe. MRI FOOT LEFT WO CONTRAST    Result Date: 9/19/2022  Osteomyelitis involving the 1st digit distal phalanx and the distal aspect of the 1st digit proximal phalanx. Small effusions at the 2nd through 4th MTP joints. Small volume intermetatarsal bursitis at the 1st and 3rd intermetatarsal spaces.  Diffuse edema and skin thickening involving the dorsum of the mid and forefoot suggestive of cellulitis. Consultations:    Consults:     Final Specialist Recommendations/Findings:   IP CONSULT TO PODIATRY  PHARMACY TO DOSE VANCOMYCIN  IP CONSULT TO INTERNAL MEDICINE  IP CONSULT TO PODIATRY  PHARMACY TO DOSE VANCOMYCIN  IP CONSULT TO INFECTIOUS DISEASES  IP CONSULT TO CARDIOLOGY  IP CONSULT TO CASE MANAGEMENT      The patient was seen and examined on day of discharge and this discharge summary is in conjunction with any daily progress note from day of discharge. Discharge plan:     Disposition: Home    Physician Follow Up:   Isha Abbasi, [address] [place] [address] [address] [id number]    Schedule an appointment as soon as possible for a visit in 1 week(s)  Post operative appointment    Zain Kaplan  [address]  [address] [facility]  [phone]    Follow up in 1 week(s)         Requiring Further Evaluation/Follow Up POST HOSPITALIZATION/Incidental Findings: Outpatient follow-up with podiatry for wound care    Diet: regular diet    Activity: As tolerated    Instructions to Patient: Call medications as prescribed, take full course of antibiotics until completion, reduce use and frequency of pain meds as pain becomes more manageable. Discharge Medications:      Medication List        START taking these medications      doxycycline hyclate 100 MG tablet  Commonly known as: VIBRA-TABS  Take 1 tablet by mouth 2 times daily for 14 days     lactobacillus Tabs  Take 1 tablet by mouth 3 times daily (with meals)     levoFLOXacin 500 MG tablet  Commonly known as: Levaquin  Take 1 tablet by mouth daily for 14 days     oxyCODONE-acetaminophen 5-325 MG per tablet  Commonly known as: Percocet  Take 1 tablet by mouth every 8 hours as needed for Pain for up to 3 days. Intended supply: 7 days.  Take lowest dose possible to manage pain            CONTINUE taking these medications      Aspirin 81 81 MG chewable tablet  Generic drug: aspirin     atorvastatin 40 MG tablet  Commonly

## 2022-09-20 NOTE — DISCHARGE INSTRUCTIONS
Podiatric Post Operative Instructions: You have had a surgical procedure on your left foot. Fluids and Diet:  Begin with clear liquids, broth, dry toast, and crackers. If not nauseated then resume your regular pre-operative diet when you are ready    Medications: Take your prescriptions as directed  You are receiving new prescriptions for pain. Please take them, as needed. If your pain is not severe then you may take the non-prescription medication that you normally take for aches and pains  You may resume your regularly scheduled medications (unless otherwise directed)  If any side effects or adverse reactions occur, discontinue the medication and contact your doctor. Review the patient drug information that is provided before you take any medication    Ambulation and Activity:  You are advised to go directly home from the hospital  You may put weight on the operated foot. You should wear the surgical shoe to the left foot at all times when awake. Avoid stairs if possible. Do not lift or move heavy objects  Do not drive until cleared by your physician    Bandage and Wound Care Instructions:  Keep bandage clean and dry  Do not shower or bathe the operative extremity  Do not remove the bandage (unless otherwise directed)   Do not attempt to put anything between the cast or dressing and your skin, some itching is normal.    Ice and Elevation:  Elevate operative extremity as much as possible to reduce swelling and discomfort. Elevate with 2 pillows at or above the level of the heart for the first 72 hours. Ice:  [facility] dispensed insulated ice bag over the bandage 20 minutes of every hour while awake for the first 72 hours. You may ice behind the knee as well. Special Instructions: Call your doctor immediately if you develop any of the following. Fever over 100.4 degrees Fahrenheit by mouth - take your temperature daily until your first follow up visit.   Pain not relieved by medication ordered  Swelling, increased redness, warmth, or hardness around operative area. Numb, tingling or cold toes. Toe(s) become white or bluish  Bandage becomes wet, soiled, or blood soaked (small amount of bleeding may be normal)  Increased or progressive drainage from surgical area. Follow up instructions:  Call when you get home to schedule or confirm your appointment. Call your Podiatrist office if you have any questions or concerns.

## 2022-09-20 NOTE — PROGRESS NOTES
Writer reviewed discharge instructions with patient. She verbalized understanding. Signature obtained. Patient sent home belongings. Script for for Levaquin, Doxycycline, Probiotic and Percocet.

## 2022-09-20 NOTE — DISCHARGE INSTR - DIET

## 2022-09-20 NOTE — CARE COORDINATION
Social work: spoke to pt about a visiting nurse. Explained to pt that since her wound was covered and podiatry did not want anyone to take off the dressing and pt stated she is getting around to the bathroom with out assistance there would not be anything for the nurse or therapist to do at this time. She asked if daily dressing changes will be needed later if that might be more appropriate. Advised pt to ask her podiatrist at that visit if needed. They can initiate the referral at that time. Pt agreeable. Informed RN of discussion.  Lisette ramirez

## 2022-09-20 NOTE — PROGRESS NOTES
Progress Note  Podiatric Medicine and Surgery     Subjective     Chief Complaint: Left great toe injury    Interval history:  Pt seen at bedside   Pt is status post distal symes amputation of the left foot  Pt denies any complaints at this time    HPI:  Carolyn Joe is a 65 y.o. female seen at [facility] for left great toe injury. He states that the wound developed while she was on vacation in [place] last week she flew home today and got admitted as she was in ED and nongranular and was started on vancomycin. She was told that she should visit her podiatrist once she gets back to [place] given the condition of her great toe. At this time she rates her pain 0 out of 10. She has a history of cardiac and vascular issues. Patient denies any other pedal complaints at this time    PCP is [facility]     Past Medical History   has a past medical history of Arthritis of big toe, Asthma, CHF (congestive heart failure) ([address] CKD (chronic kidney disease), stage III ([address] Diabetes mellitus ([address] History of anemia, History of blood transfusion, Hyperlipidemia, Hypertension, Left ventricular systolic dysfunction, Neuropathy, Nonischemic congestive cardiomyopathy ([address] and Obesity. Past Surgical History   has a past surgical history that includes  section; Sleeve Gastrectomy (10/07/2020); Cardiac catheterization (2020); Facial cosmetic surgery (); Foot surgery (Right); toenail excision (Bilateral); Toe Surgery (Left, 2020); arthrodesis (Left, 2020); and Toe amputation (Left, 2022). Medications  Prior to Admission medications    Medication Sig Start Date End Date Taking?  Authorizing Provider   bumetanide (BUMEX) 1 MG tablet Take 1 tablet by mouth 2 times daily 22  Yes Historical Provider, MD   aspirin (ASPIRIN 81) 81 MG chewable tablet Take 1 tablet by mouth daily 22  Yes Historical Provider, MD   ferrous sulfate (IRON 325) 325 (65 Fe) MG tablet grandfather; Diabetes in her brother, father, and mother; Glaucoma in her father and paternal grandfather; Heart Disease in her brother; High Cholesterol in her brother, father, and mother; Kidney Disease in her brother. Social History   reports that she has never smoked. She has never used smokeless tobacco.   reports that she does not currently use alcohol. reports no history of drug use. Objective     Vitals:  Patient Vitals for the past 8 hrs:   BP Temp Temp src Pulse Resp SpO2 Weight   22 0712 132/64 97.4 °F (36.3 °C) Oral 61 16 100 % --   22 0510 -- -- -- -- -- -- 213 lb 4.8 oz (96.8 kg)   22 0450 (!) 144/77 97.9 °F (36.6 °C) Oral 64 16 100 % --     Average, Min, and Max for last 24 hours Vitals:  TEMPERATURE:  Temp  Av.1 °F (36.7 °C)  Min: 97.4 °F (36.3 °C)  Max: 98.6 °F (37 °C)    RESPIRATIONS RANGE: Resp  Av  Min: 16  Max: 16    PULSE RANGE: Pulse  Av.4  Min: 61  Max: 69    BLOOD PRESSURE RANGE:  Systolic (46TBH), ORN:673 , Min:127 , ZGW:308   ; Diastolic (97RFJ), LHV:05, Min:62, Max:77      PULSE OXIMETRY RANGE: SpO2  Av %  Min: 100 %  Max: 100 %  I&O:  I/O last 3 completed shifts: In: 1537.9 [P.O.:840; I.V.:73.8; IV Piggyback:624.1]  Out: -     CBC:  Recent Labs     22  1810 22  0616 22   WBC 7.1 4.7 5.5   HGB 8.5* 7.9* 7.2*   HCT 26.0* 24.3* 21.9*    165 162   CRP 10.4* 5.0  --         BMP:  Recent Labs     22  0838 22  0616 22    141 139   K 4.4 4.5 4.7   * 110* 109*   CO2 25 24 24   BUN 24* 19 21*   CREATININE 1.30* 1.31* 1.30*   GLUCOSE 116* 105* 113*   CALCIUM 8.8 8.9 8.5*        Coags:  No results for input(s): APTT, PROT, INR in the last 72 hours.     No results found for: LABA1C  Lab Results   Component Value Date    SEDRATE 31 (H) 2022     Lab Results   Component Value Date    CRP 5.0 2022         Physical Exam: Physical exam from 22 due s/p state  Vascular: DP and PT pulses are none palpable but slightly audible on Doppler. CFT < 4 seconds  brisk to all digits. Hair growth is absent to the level of the digits. Moderate edema appreciated to the left lower extremity. Neuro: Saph/sural/SP/DP/plantar sensation intact to light touch. Musculoskeletal: Muscle strength is 4/5 to all lower extremity muscle groups. Compartments are soft and compressible. No pain with compression of posterior calf. Dermatologic: Full thickness ulcer #1 noted to the dorsal aspect of the left great toe and measures approximately 2cm x 0.8 x 1cm. Base is fibrogranular. Periwound skin is macerated with fluid underneath. Drainage noted with no associated mal odor. Mild erythema appreciated with no warmth. Does not probe to bone, sinus track, or undermine. No fluctuance, crepitus, or induration. Interdigital maceration absent. Clinical:   See media panel    Imaging:   MRI FOOT LEFT WO CONTRAST   Final Result   Osteomyelitis involving the 1st digit distal phalanx and the distal aspect of   the 1st digit proximal phalanx. Small effusions at the 2nd through 4th MTP joints. Small volume intermetatarsal bursitis at the 1st and 3rd intermetatarsal   spaces. Diffuse edema and skin thickening involving the dorsum of the mid and   forefoot suggestive of cellulitis. VL Arterial PVR Lower   Final Result      XR FOOT LEFT (MIN 3 VIEWS)   Final Result   Osteomyelitis of the tuft of the distal phalanx of the great toe.              Cultures: Cultures taken pending results    Assessment     Sharon Jenkins is a 65 y.o. female with   S/p disal syme hallux amputation; left foot  Full-thickness ulceration down to level of subcutaneous, left foot  Osteomyelitis, left foot  History of CVA  Hypertension  CHF  PVD    Principal Problem:    Osteomyelitis of great toe (HCC)  Active Problems:    Hyperlipidemia, unspecified    History of CVA (cerebrovascular accident)    Asthma    Essential

## 2022-09-20 NOTE — OP NOTE
treatment options were thoroughly discussed with the patient and it was decided to move forward with fillet of the left hallux at the level of IPJ. This has failed conservative treatments thus far, and the patient elects to undergo surgical correction. Discussed with patient that the she is at high risk for further amputation and limb loss given his history of infections, diabetes mellitus and previous amputations. All risks and benefits discussed with the patient in detail. No guarantees were given or implied. Consent signed and in the chart. Covid is negative. PROCEDURE IN DETAIL: Under mild sedation patient was transported from inpatient room to the operating room and placed on the operating table in the supine position with a safety strap across the lap. An pneumatic ankle tourniquet was applied to the left ankle. After adequate sedation by anesthesia, a local block of 10 cc of 0.5% Marcaine plain 1% lidocaine plain was injected. The foot was then prepped and draped in the usual aseptic fashion. A timeout was performed confirming the correct patient, correct procedure, correct site, preoperative antibiotics, everyone in the room was in agreement. The foot was exsanguinated using an eschmark bandage and the pneumatic ankle tourniquet was inflated to 250mmHg where it would remain inflated for 32 minutes throughout the procedure    Attention was directed to the dorsal-medial aspect of the left hallux. There was a full-thickness wound down to the level of bone to the distal aspect of the digit with mild purulent drainage appreciated. A #15 blade was used to create a thick plantar medial flap. The flap was meticulously dissected out from the left hallux at the level of IPJ, care was taken to preserve the plantar lateral neurovascular bundle and its pedicle attachments. The flap measured approximately 1.5 cm x 1.7 cm.  The phalanx was was freed from all sift tissue attachments with a # 15 blade and a bone cutter was used to excise the proximal phalanx at the level of IPJ. The bone was passed from the table as specimen to be sent for pathology and culture. The underlying proximal phalanx was noted to be healthy. The excised bone noted to be soft and necrotic upon bone quality testing consistent with osteomyelitis. All non-viable tissue was sharply excised. There was adequate bleeding appreciated. Vitalized and viable tissue noted surrounding the  amputation site. Next the surgical site was irrigated with copious amounts of sterile saline. The surgical incision margins were then remodeled to allow for adequate closure. The surgical site was closed in layers utilizing 3-0 monocryl for deep closure and 3-0 nylon for skin closure. Dressings consisted of adaptic, 4 x 4s, ABD, Kerlix and an Ace bandage were applied. The patient tolerated the above procedure and anesthesia well without complications. The patient was transported from the operating room to the PACU with vital signs stable and neurovascular status intact to the left foot. Patient was then transferred back to the floor.

## 2022-09-20 NOTE — PROGRESS NOTES
[facility]  Office: [address], DO, Dipak Sheron, DO, Delvin Doyle, DO, Carter Lugo, DO, Cordella Holstein, MD, Kentrell Batres MD, Faustino Lemus MD, Alejo Fajardo MD,  Elkin Boudreaux MD, Alicia Villegas MD, Sly Arthur, DO, Karla Elliott MD,  Olivier Hart MD, Balbina Dorsey MD, Timmy Montano DO, Jon Medina MD, Raudel Ferguson MD, Morgan Aquino MD, Daisy Garnica MD, Momo Fitzpatrick MD, Noah Tejada MD, Lyndsey Medina DO, Russ Bill MD, Fermín Baker MD, Kathia Graves, CNP,  Kimmy Caballero, CNP, Mynor Maria, CNP, Austin Moura, CNP,  Ric Gill, DNP, Екатерина Silva, CNP, Jorge Humphreys, CNP, Vaughn Jones, CNP, Manuel Morgan, CNP, Kitty Hitchcock, CNP, Rosie Roth PA-C, Frances Hensley, CNS, Beena Mendoza, [facility], Remi Jenkins, CNP, Aaliyah Escoto, CNP, Sushma Urrutia, [facility]    Progress Note    9/20/2022    10:28 AM    Name:   Carolyn Joe  MRN:     [id number]     Acct:      [de-identified]   Room:   2022/2022-01   Day:  3  Admit Date:  9/17/2022  5:31 PM    PCP:   Raegan Carpenter  Code Status:  Full Code    Subjective:     C/C:   Chief Complaint   Patient presents with    Foot Injury     Patient injured big toe on the left foot in [place] and was in the hospital for three days. Patient is in a ortho shoe now and was told to go to the ER when she got home     Interval History Status: . S/p left hallux amputation, surgical dressing in place. Patient reports her pain is well controlled, vss   Brief History:   Patient presents to the emergency room today with complaints of a left great toe infection. Patient states that she origionally injured her left great toe on Wednesday and was visiting her brother in [place]. Patient was admitted to the hospital (9/14/2022-9/17/2022) and left AMA but advised to follow up in ED when she got home.  Patient states that her feet and legs have been swollen and she developed a \"bump\" on her left great toe. Wednesday night the lights were turned off and she hit her toe going up the stairs and felt fluid come from her toe. When she turned the lights on to evaluate her toe, she noticed that there was a mixture of blood and purulent drainage coming from her toe. Patient was admitted and discharged on Doxycycline and advised to follow up. Patient also states that she is experiencing diarrhea from all of the antibiotics. Patient denies any fevers, chills, chest pain, shortness of breath, nausea and vomiting. Patient has a significant past medical history of CHF, CKD, DM, HLD, HTN, LUIS, CVA, and GERD. Throughout the emergency room evaluation it was noted that her chloride level is 109. BUN 27. Creat 1.46. GFR 45. CRP 10.4. Hemoglobin 8.5. Sed rate 54. An Xray was obtained of her left foot which shows: Osteomyelitis of the tuft of the distal phalanx of the great toe. Review of Systems:     Constitutional:  negative for chills, fevers, sweats  Respiratory:  negative for cough, dyspnea on exertion, shortness of breath, wheezing  Cardiovascular:  negative for chest pain, chest pressure/discomfort, lower extremity edema, palpitations  Gastrointestinal:  negative for abdominal pain, constipation, diarrhea, denies nausea, vomiting  Neurological:  negative for dizziness, headache  Positive for wound  Medications:      Allergies:  No Known Allergies    Current Meds:   Scheduled Meds:    vancomycin  1,000 mg IntraVENous Q24H    vancomycin (VANCOCIN) intermittent dosing (placeholder)   Other RX Placeholder    heparin (porcine)  5,000 Units SubCUTAneous 3 times per day    sodium chloride flush  5-40 mL IntraVENous 2 times per day    cefepime  2,000 mg IntraVENous Q12H    prenatal vitamin  1 tablet Oral Daily    bumetanide  1 mg Oral Daily    atorvastatin  40 mg Oral Daily    lisinopril  5 mg Oral Daily    vitamin B-12  1,000 mcg Oral Daily    carvedilol  6.25 mg Oral BID WC    lactobacillus  1 tablet Oral TID WC    aspirin  81 mg Oral Daily    ferrous sulfate  325 mg Oral Daily with breakfast    folic acid  1 mg Oral Daily     Continuous Infusions:    sodium chloride Stopped (22 0444)     PRN Meds: sodium chloride flush, sodium chloride, potassium chloride **OR** potassium alternative oral replacement **OR** potassium chloride, magnesium sulfate, ondansetron **OR** ondansetron, polyethylene glycol, acetaminophen **OR** acetaminophen, albuterol sulfate HFA    Data:     Past Medical History:   has a past medical history of Arthritis of big toe, Asthma, CHF (congestive heart failure) ([facility] [address] CKD (chronic kidney disease), stage III ([facility] [address] Diabetes mellitus ([facility] [address] History of anemia, History of blood transfusion, Hyperlipidemia, Hypertension, Left ventricular systolic dysfunction, Neuropathy, Nonischemic congestive cardiomyopathy ([facility] [address] and Obesity. Social History:   reports that she has never smoked. She has never used smokeless tobacco. She reports that she does not currently use alcohol. She reports that she does not use drugs. Family History:   Family History   Problem Relation Age of Onset    High Cholesterol Mother     Diabetes Mother     High Cholesterol Father     Diabetes Father     Glaucoma Father     High Cholesterol Brother     Diabetes Brother     Heart Disease Brother     Kidney Disease Brother     Blindness Maternal Grandfather     Cataracts Maternal Grandfather     Glaucoma Paternal Grandfather        Vitals:  /64   Pulse 61   Temp 97.4 °F (36.3 °C) (Oral)   Resp 16   Ht 5' 11\" (1.803 m)   Wt 213 lb 4.8 oz (96.8 kg)   SpO2 100%   BMI 29.75 kg/m²   Temp (24hrs), Av.1 °F (36.7 °C), Min:97.4 °F (36.3 °C), Max:98.6 °F (37 °C)    No results for input(s): POCGLU in the last 72 hours. I/O (24Hr):     Intake/Output Summary (Last 24 hours) at 2022 1038  Last data filed at 2022 0629  Gross per 24 hour Intake 1297.91 ml   Output --   Net 1297.91 ml       Labs:  Hematology:  Recent Labs     09/17/22  1810 09/19/22 0616 09/20/22 0622   WBC 7.1 4.7 5.5   RBC 2.82* 2.66* 2.42*   HGB 8.5* 7.9* 7.2*   HCT 26.0* 24.3* 21.9*   MCV 92.2 91.4 90.5   MCH 30.1 29.7 29.8   MCHC 32.7 32.5 32.9   RDW 12.6 12.3 12.4    165 162   MPV 9.9 9.4 10.0   SEDRATE 54* 31*  --    CRP 10.4* 5.0  --      Chemistry:  Recent Labs     09/18/22  0838 09/19/22 0616 09/20/22 0622    141 139   K 4.4 4.5 4.7   * 110* 109*   CO2 25 24 24   GLUCOSE 116* 105* 113*   BUN 24* 19 21*   CREATININE 1.30* 1.31* 1.30*   ANIONGAP 8* 7* 6*   LABGLOM 42* 42* 42*   GFRAA 51* 51* 51*   CALCIUM 8.8 8.9 8.5*   No results for input(s): PROT, LABALBU, LABA1C, W5EBFPE, H9BRTNS, FT4, TSH, AST, ALT, LDH, GGT, ALKPHOS, LABGGT, BILITOT, BILIDIR, AMMONIA, AMYLASE, LIPASE, LACTATE, CHOL, HDL, LDLCHOLESTEROL, CHOLHDLRATIO, TRIG, VLDL, XAB66SZ, PHENYTOIN, PHENYF, URICACID, POCGLU in the last 72 hours. ABG:No results found for: POCPH, PHART, PH, POCPCO2, BDC7LNQ, PCO2, POCPO2, PO2ART, PO2, POCHCO3, NZS1AOW, HCO3, NBEA, PBEA, BEART, BE, THGBART, THB, CVL0LHS, ZGJN5HYS, G8ISZRKC, O2SAT, FIO2  Lab Results   Component Value Date/Time    SPECIAL L AC 6 ML 09/17/2022 06:10 PM     Lab Results   Component Value Date/Time    CULTURE PENDING 09/19/2022 03:32 PM       Radiology:  XR FOOT LEFT (MIN 3 VIEWS)    Result Date: 9/17/2022  Osteomyelitis of the tuft of the distal phalanx of the great toe.        Physical Examination:        General appearance:  alert, cooperative and no distress  Mental Status:  oriented to person, place and time and normal affect  Lungs:  clear to auscultation bilaterally, normal effort  Heart:  regular rate and rhythm, no murmur  Abdomen:  soft, nontender, nondistended, normal bowel sounds, no masses, hepatomegaly, splenomegaly  Extremities:  + Dressing left great toe  Skin:  no gross lesions, rashes, induration    Assessment:

## 2022-09-21 LAB — SURGICAL PATHOLOGY REPORT: NORMAL

## 2022-09-21 NOTE — PROGRESS NOTES
CLINICAL PHARMACY NOTE: MEDS TO BEDS    Total # of Prescriptions Filled: 4   The following medications were delivered to the patient:  Doxycycline Hyclate 100 mg caps  Levofloxacin 500 mg tabs  Probiotic Gold Caps  Oxycodone-APAP 5-325 mg tabs    Additional Documentation:     Delivered to the pt's room 9/20/22. $10.00 copay, paid with card.

## 2022-09-22 LAB
CULTURE: NORMAL
CULTURE: NORMAL
Lab: NORMAL
Lab: NORMAL
SPECIMEN DESCRIPTION: NORMAL
SPECIMEN DESCRIPTION: NORMAL

## 2022-09-23 LAB
CULTURE: ABNORMAL
CULTURE: ABNORMAL
DIRECT EXAM: ABNORMAL
DIRECT EXAM: ABNORMAL
SPECIMEN DESCRIPTION: ABNORMAL

## 2022-09-24 ENCOUNTER — HOSPITAL ENCOUNTER (EMERGENCY)
Age: 57
Discharge: HOME | End: 2022-09-24
Attending: EMERGENCY MEDICINE
Payer: MEDICARE

## 2022-09-24 VITALS
DIASTOLIC BLOOD PRESSURE: 77 MMHG | TEMPERATURE: 98.4 F | BODY MASS INDEX: 29.82 KG/M2 | OXYGEN SATURATION: 100 % | RESPIRATION RATE: 16 BRPM | WEIGHT: 213 LBS | SYSTOLIC BLOOD PRESSURE: 127 MMHG | HEART RATE: 71 BPM | HEIGHT: 71 IN

## 2022-09-24 DIAGNOSIS — Z48.01 ENCOUNTER FOR SURGICAL WOUND DRESSING CHANGE: Primary | ICD-10-CM

## 2022-09-24 LAB
CULTURE: NORMAL
DIRECT EXAM: NORMAL
DIRECT EXAM: NORMAL
SPECIMEN DESCRIPTION: NORMAL

## 2022-09-24 PROCEDURE — 99282 EMERGENCY DEPT VISIT SF MDM: CPT

## 2022-09-24 ASSESSMENT — ENCOUNTER SYMPTOMS
SHORTNESS OF BREATH: 0
DIARRHEA: 0
VOMITING: 0
CONSTIPATION: 0
EYE REDNESS: 0
FACIAL SWELLING: 0
ABDOMINAL PAIN: 0
COLOR CHANGE: 0
EYE DISCHARGE: 0
COUGH: 0

## 2022-09-24 ASSESSMENT — PAIN - FUNCTIONAL ASSESSMENT: PAIN_FUNCTIONAL_ASSESSMENT: NONE - DENIES PAIN

## 2022-09-24 NOTE — ED NOTES
Writer at bedside with Dr Lucas Cantor for pt assessment. Pt c/o L foot dressing becoming undone yesterday; pt attempted to re-dress but was unable to do so. Pt reports that dressing is supposed to go to podiatry appt on Tuesday this week. Pt's dressing undone; adaptic used to the suture site, kerlex, and ace bandage wrap.       Laura Weiss RN  09/24/22 1100

## 2022-09-24 NOTE — ED NOTES
Pt reports dressing change to L foot yesterday at Podiatry. Pt is not happy with dressing. Requests a new dressing with toes wrapped & protected.       Huma Swartz RN  09/24/22 9162

## 2022-09-24 NOTE — ED NOTES
New dressing applied to L toe incision, see wound assessment in LDAs. PMS intact prior to dressing and after dressing applied. Pt educated on S/S of infection and how to monitor for good blood flow, verbalized understanding.       Hillary Weiss RN  09/24/22 5239
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
## 2022-10-03 LAB
EKG ATRIAL RATE: 74 BPM
EKG P AXIS: 68 DEGREES
EKG P-R INTERVAL: 192 MS
EKG Q-T INTERVAL: 396 MS
EKG QRS DURATION: 120 MS
EKG QTC CALCULATION (BAZETT): 439 MS
EKG R AXIS: -9 DEGREES
EKG T AXIS: 74 DEGREES
EKG VENTRICULAR RATE: 74 BPM

## 2024-06-05 ENCOUNTER — HOSPITAL ENCOUNTER (OUTPATIENT)
Age: 59
Setting detail: OUTPATIENT SURGERY
Discharge: HOME | End: 2024-06-05
Attending: PODIATRIST | Admitting: PODIATRIST

## 2024-06-05 ENCOUNTER — ANESTHESIA EVENT (OUTPATIENT)
Dept: OPERATING ROOM | Age: 59
End: 2024-06-05

## 2024-06-05 ENCOUNTER — ANESTHESIA (OUTPATIENT)
Dept: OPERATING ROOM | Age: 59
End: 2024-06-05

## 2024-06-05 VITALS
SYSTOLIC BLOOD PRESSURE: 131 MMHG | HEART RATE: 66 BPM | DIASTOLIC BLOOD PRESSURE: 76 MMHG | BODY MASS INDEX: 27.63 KG/M2 | WEIGHT: 204 LBS | TEMPERATURE: 97.2 F | HEIGHT: 72 IN | RESPIRATION RATE: 18 BRPM | OXYGEN SATURATION: 100 %

## 2024-06-05 DIAGNOSIS — G89.18 POST-OP PAIN: ICD-10-CM

## 2024-06-05 DIAGNOSIS — M86.071 ACUTE HEMATOGENOUS OSTEOMYELITIS, RIGHT ANKLE AND FOOT (HCC): ICD-10-CM

## 2024-06-05 DIAGNOSIS — Z98.890 POST-OPERATIVE STATE: Primary | ICD-10-CM

## 2024-06-05 LAB
GLUCOSE BLD-MCNC: 128 MG/DL (ref 65–105)
GLUCOSE BLD-MCNC: 69 MG/DL (ref 65–105)

## 2024-06-05 PROCEDURE — 6360000002 HC RX W HCPCS: Performed by: PODIATRIST

## 2024-06-05 PROCEDURE — 2580000003 HC RX 258: Performed by: ANESTHESIOLOGY

## 2024-06-05 PROCEDURE — 7100000010 HC PHASE II RECOVERY - FIRST 15 MIN: Performed by: PODIATRIST

## 2024-06-05 PROCEDURE — 7100000011 HC PHASE II RECOVERY - ADDTL 15 MIN: Performed by: PODIATRIST

## 2024-06-05 PROCEDURE — 2500000003 HC RX 250 WO HCPCS: Performed by: NURSE ANESTHETIST, CERTIFIED REGISTERED

## 2024-06-05 PROCEDURE — 3700000001 HC ADD 15 MINUTES (ANESTHESIA): Performed by: PODIATRIST

## 2024-06-05 PROCEDURE — 3600000012 HC SURGERY LEVEL 2 ADDTL 15MIN: Performed by: PODIATRIST

## 2024-06-05 PROCEDURE — 88311 DECALCIFY TISSUE: CPT

## 2024-06-05 PROCEDURE — 2500000003 HC RX 250 WO HCPCS: Performed by: PODIATRIST

## 2024-06-05 PROCEDURE — 3700000000 HC ANESTHESIA ATTENDED CARE: Performed by: PODIATRIST

## 2024-06-05 PROCEDURE — 88305 TISSUE EXAM BY PATHOLOGIST: CPT

## 2024-06-05 PROCEDURE — 2709999900 HC NON-CHARGEABLE SUPPLY: Performed by: PODIATRIST

## 2024-06-05 PROCEDURE — 3600000002 HC SURGERY LEVEL 2 BASE: Performed by: PODIATRIST

## 2024-06-05 PROCEDURE — 2580000003 HC RX 258: Performed by: NURSE ANESTHETIST, CERTIFIED REGISTERED

## 2024-06-05 PROCEDURE — 7100000001 HC PACU RECOVERY - ADDTL 15 MIN: Performed by: PODIATRIST

## 2024-06-05 PROCEDURE — 6360000002 HC RX W HCPCS: Performed by: NURSE ANESTHETIST, CERTIFIED REGISTERED

## 2024-06-05 PROCEDURE — 82947 ASSAY GLUCOSE BLOOD QUANT: CPT

## 2024-06-05 PROCEDURE — 7100000000 HC PACU RECOVERY - FIRST 15 MIN: Performed by: PODIATRIST

## 2024-06-05 RX ORDER — PROPOFOL 10 MG/ML
INJECTION, EMULSION INTRAVENOUS PRN
Status: DISCONTINUED | OUTPATIENT
Start: 2024-06-05 | End: 2024-06-05 | Stop reason: SDUPTHER

## 2024-06-05 RX ORDER — HYDROMORPHONE HYDROCHLORIDE 1 MG/ML
0.5 INJECTION, SOLUTION INTRAMUSCULAR; INTRAVENOUS; SUBCUTANEOUS EVERY 5 MIN PRN
Status: DISCONTINUED | OUTPATIENT
Start: 2024-06-05 | End: 2024-06-05 | Stop reason: HOSPADM

## 2024-06-05 RX ORDER — MEPERIDINE HYDROCHLORIDE 50 MG/ML
12.5 INJECTION INTRAMUSCULAR; INTRAVENOUS; SUBCUTANEOUS EVERY 5 MIN PRN
Status: DISCONTINUED | OUTPATIENT
Start: 2024-06-05 | End: 2024-06-05 | Stop reason: HOSPADM

## 2024-06-05 RX ORDER — FENTANYL CITRATE 50 UG/ML
25 INJECTION, SOLUTION INTRAMUSCULAR; INTRAVENOUS EVERY 5 MIN PRN
Status: DISCONTINUED | OUTPATIENT
Start: 2024-06-05 | End: 2024-06-05 | Stop reason: HOSPADM

## 2024-06-05 RX ORDER — SODIUM CHLORIDE 0.9 % (FLUSH) 0.9 %
5-40 SYRINGE (ML) INJECTION PRN
Status: DISCONTINUED | OUTPATIENT
Start: 2024-06-05 | End: 2024-06-05 | Stop reason: HOSPADM

## 2024-06-05 RX ORDER — SODIUM CHLORIDE, SODIUM LACTATE, POTASSIUM CHLORIDE, CALCIUM CHLORIDE 600; 310; 30; 20 MG/100ML; MG/100ML; MG/100ML; MG/100ML
INJECTION, SOLUTION INTRAVENOUS CONTINUOUS
Status: DISCONTINUED | OUTPATIENT
Start: 2024-06-05 | End: 2024-06-05 | Stop reason: HOSPADM

## 2024-06-05 RX ORDER — NALOXONE HYDROCHLORIDE 0.4 MG/ML
INJECTION, SOLUTION INTRAMUSCULAR; INTRAVENOUS; SUBCUTANEOUS PRN
Status: DISCONTINUED | OUTPATIENT
Start: 2024-06-05 | End: 2024-06-05 | Stop reason: HOSPADM

## 2024-06-05 RX ORDER — ONDANSETRON 2 MG/ML
INJECTION INTRAMUSCULAR; INTRAVENOUS PRN
Status: DISCONTINUED | OUTPATIENT
Start: 2024-06-05 | End: 2024-06-05 | Stop reason: SDUPTHER

## 2024-06-05 RX ORDER — SODIUM CHLORIDE, SODIUM LACTATE, POTASSIUM CHLORIDE, CALCIUM CHLORIDE 600; 310; 30; 20 MG/100ML; MG/100ML; MG/100ML; MG/100ML
INJECTION, SOLUTION INTRAVENOUS CONTINUOUS PRN
Status: DISCONTINUED | OUTPATIENT
Start: 2024-06-05 | End: 2024-06-05 | Stop reason: SDUPTHER

## 2024-06-05 RX ORDER — LIDOCAINE HYDROCHLORIDE 10 MG/ML
INJECTION, SOLUTION INFILTRATION; PERINEURAL
Status: DISCONTINUED
Start: 2024-06-05 | End: 2024-06-05 | Stop reason: HOSPADM

## 2024-06-05 RX ORDER — BUPIVACAINE HYDROCHLORIDE 5 MG/ML
INJECTION, SOLUTION EPIDURAL; INTRACAUDAL
Status: DISCONTINUED
Start: 2024-06-05 | End: 2024-06-05 | Stop reason: HOSPADM

## 2024-06-05 RX ORDER — LIDOCAINE HYDROCHLORIDE 10 MG/ML
1 INJECTION, SOLUTION EPIDURAL; INFILTRATION; INTRACAUDAL; PERINEURAL
Status: DISCONTINUED | OUTPATIENT
Start: 2024-06-06 | End: 2024-06-05 | Stop reason: HOSPADM

## 2024-06-05 RX ORDER — DIPHENHYDRAMINE HYDROCHLORIDE 50 MG/ML
12.5 INJECTION INTRAMUSCULAR; INTRAVENOUS
Status: DISCONTINUED | OUTPATIENT
Start: 2024-06-05 | End: 2024-06-05 | Stop reason: HOSPADM

## 2024-06-05 RX ORDER — SODIUM CHLORIDE 9 MG/ML
INJECTION, SOLUTION INTRAVENOUS CONTINUOUS
Status: DISCONTINUED | OUTPATIENT
Start: 2024-06-05 | End: 2024-06-05 | Stop reason: HOSPADM

## 2024-06-05 RX ORDER — MIDAZOLAM HYDROCHLORIDE 1 MG/ML
INJECTION INTRAMUSCULAR; INTRAVENOUS PRN
Status: DISCONTINUED | OUTPATIENT
Start: 2024-06-05 | End: 2024-06-05 | Stop reason: SDUPTHER

## 2024-06-05 RX ORDER — DOXYCYCLINE HYCLATE 100 MG
100 TABLET ORAL 2 TIMES DAILY
COMMUNITY
Start: 2024-05-30

## 2024-06-05 RX ORDER — FENTANYL CITRATE 50 UG/ML
INJECTION, SOLUTION INTRAMUSCULAR; INTRAVENOUS PRN
Status: DISCONTINUED | OUTPATIENT
Start: 2024-06-05 | End: 2024-06-05 | Stop reason: SDUPTHER

## 2024-06-05 RX ORDER — ACETAMINOPHEN AND CODEINE PHOSPHATE 300; 30 MG/1; MG/1
1 TABLET ORAL EVERY 4 HOURS PRN
Qty: 30 TABLET | Refills: 0 | Status: SHIPPED | OUTPATIENT
Start: 2024-06-05 | End: 2024-06-10

## 2024-06-05 RX ORDER — LIDOCAINE HYDROCHLORIDE 20 MG/ML
INJECTION, SOLUTION EPIDURAL; INFILTRATION; INTRACAUDAL; PERINEURAL PRN
Status: DISCONTINUED | OUTPATIENT
Start: 2024-06-05 | End: 2024-06-05 | Stop reason: SDUPTHER

## 2024-06-05 RX ORDER — DULAGLUTIDE 1.5 MG/.5ML
1.5 INJECTION, SOLUTION SUBCUTANEOUS WEEKLY
COMMUNITY
Start: 2024-05-29 | End: 2025-04-30

## 2024-06-05 RX ORDER — CEFAZOLIN SODIUM 1 G/3ML
INJECTION, POWDER, FOR SOLUTION INTRAMUSCULAR; INTRAVENOUS PRN
Status: DISCONTINUED | OUTPATIENT
Start: 2024-06-05 | End: 2024-06-05 | Stop reason: SDUPTHER

## 2024-06-05 RX ORDER — DEXTROSE MONOHYDRATE AND SODIUM CHLORIDE 5; .45 G/100ML; G/100ML
INJECTION, SOLUTION INTRAVENOUS ONCE
Status: COMPLETED | OUTPATIENT
Start: 2024-06-05 | End: 2024-06-05

## 2024-06-05 RX ORDER — OXYCODONE HYDROCHLORIDE 5 MG/1
5 TABLET ORAL
Status: DISCONTINUED | OUTPATIENT
Start: 2024-06-05 | End: 2024-06-05 | Stop reason: HOSPADM

## 2024-06-05 RX ORDER — PROCHLORPERAZINE EDISYLATE 5 MG/ML
10 INJECTION INTRAMUSCULAR; INTRAVENOUS
Status: DISCONTINUED | OUTPATIENT
Start: 2024-06-05 | End: 2024-06-05 | Stop reason: HOSPADM

## 2024-06-05 RX ORDER — SODIUM CHLORIDE 9 MG/ML
INJECTION, SOLUTION INTRAVENOUS PRN
Status: DISCONTINUED | OUTPATIENT
Start: 2024-06-05 | End: 2024-06-05 | Stop reason: HOSPADM

## 2024-06-05 RX ORDER — SODIUM CHLORIDE 0.9 % (FLUSH) 0.9 %
5-40 SYRINGE (ML) INJECTION EVERY 12 HOURS SCHEDULED
Status: DISCONTINUED | OUTPATIENT
Start: 2024-06-05 | End: 2024-06-05 | Stop reason: HOSPADM

## 2024-06-05 RX ORDER — FENTANYL CITRATE 50 UG/ML
INJECTION, SOLUTION INTRAMUSCULAR; INTRAVENOUS PRN
Status: DISCONTINUED | OUTPATIENT
Start: 2024-06-05 | End: 2024-06-05

## 2024-06-05 RX ORDER — DEXAMETHASONE SODIUM PHOSPHATE 10 MG/ML
INJECTION, SOLUTION INTRAMUSCULAR; INTRAVENOUS PRN
Status: DISCONTINUED | OUTPATIENT
Start: 2024-06-05 | End: 2024-06-05 | Stop reason: SDUPTHER

## 2024-06-05 RX ORDER — METOCLOPRAMIDE HYDROCHLORIDE 5 MG/ML
10 INJECTION INTRAMUSCULAR; INTRAVENOUS
Status: DISCONTINUED | OUTPATIENT
Start: 2024-06-05 | End: 2024-06-05 | Stop reason: HOSPADM

## 2024-06-05 RX ADMIN — CEFAZOLIN 2 G: 1 INJECTION, POWDER, FOR SOLUTION INTRAMUSCULAR; INTRAVENOUS at 11:10

## 2024-06-05 RX ADMIN — DEXAMETHASONE SODIUM PHOSPHATE 10 MG: 10 INJECTION INTRAMUSCULAR; INTRAVENOUS at 11:10

## 2024-06-05 RX ADMIN — LIDOCAINE HYDROCHLORIDE 100 MG: 20 INJECTION, SOLUTION EPIDURAL; INFILTRATION; INTRACAUDAL; PERINEURAL at 11:02

## 2024-06-05 RX ADMIN — ONDANSETRON 4 MG: 2 INJECTION INTRAMUSCULAR; INTRAVENOUS at 11:28

## 2024-06-05 RX ADMIN — PROPOFOL 150 MG: 10 INJECTION, EMULSION INTRAVENOUS at 11:02

## 2024-06-05 RX ADMIN — FENTANYL CITRATE 50 MCG: 50 INJECTION INTRAMUSCULAR; INTRAVENOUS at 11:02

## 2024-06-05 RX ADMIN — SODIUM CHLORIDE, POTASSIUM CHLORIDE, SODIUM LACTATE AND CALCIUM CHLORIDE: 600; 310; 30; 20 INJECTION, SOLUTION INTRAVENOUS at 10:58

## 2024-06-05 RX ADMIN — SODIUM CHLORIDE, POTASSIUM CHLORIDE, SODIUM LACTATE AND CALCIUM CHLORIDE: 600; 310; 30; 20 INJECTION, SOLUTION INTRAVENOUS at 09:09

## 2024-06-05 RX ADMIN — DEXTROSE AND SODIUM CHLORIDE: 5; 450 INJECTION, SOLUTION INTRAVENOUS at 09:10

## 2024-06-05 RX ADMIN — MIDAZOLAM 2 MG: 1 INJECTION INTRAMUSCULAR; INTRAVENOUS at 10:58

## 2024-06-05 ASSESSMENT — PAIN - FUNCTIONAL ASSESSMENT
PAIN_FUNCTIONAL_ASSESSMENT: 0-10
PAIN_FUNCTIONAL_ASSESSMENT: FACE, LEGS, ACTIVITY, CRY, AND CONSOLABILITY (FLACC)

## 2024-06-05 NOTE — BRIEF OP NOTE
PODIATRY BRIEF OP NOTE    PATIENT NAME: Meg Nguyen  YOB: 1965  -  58 y.o. female  MRN: [id number]  DATE: 6/5/2024  BILLING #: [id number]    Surgeon(s):  Damien Duran DPM     ASSISTANTS: ANA LILIA Clements DPM    PRE-OP DIAGNOSIS:   Osteomyelitis, right foot  Diabetic foot ulcer, right foot  Diabetic foot infection, right foot  Type 2 diabetes mellitus     POST-OP DIAGNOSIS: Same as above.    PROCEDURE:   Fillet of toe, right hallux    ANESTHESIA: MAC    HEMOSTASIS: Pneumatic tourniquet to right ankle @ 250 mmHg for 25 minutes.    ESTIMATED BLOOD LOSS: Less than 20cc.    MATERIALS:   * No implants in log *    INJECTABLES: Preoperatively an injection of 1:1 mix of 0.5% marcaine plain and 1% lidocaine plain    SPECIMEN:   ID Type Source Tests Collected by Time Destination   A : RIGHT GREAT TOE Tissue Toe SURGICAL PATHOLOGY Damien Duran DPM 6/5/2024 1128        COMPLICATIONS: None    FINDINGS: Attention was directed to the distal right hallux where a chronic ulceration was noted. A fishmouth incision was created at the level of the DIPJ and the distal phalanx was disarticulated and passed to the back table as a specimen. The incision was then flushed and closed in layers.    Elgin Almeida DPM   Podiatric Medicine & Surgery   6/5/2024 at 11:59 AM

## 2024-06-05 NOTE — OP NOTE
PODIATRY OP NOTE     PATIENT NAME: Meg Nguyen  YOB: 1965  -  58 y.o. female  MRN: [id number]  DATE: 6/5/2024  BILLING #: [id number]     Surgeon(s):  Damien Duran DPM      ASSISTANTS: ANA LILIA Clements DPM     PRE-OP DIAGNOSIS:   Osteomyelitis, right foot  Diabetic foot ulcer, right foot  Diabetic foot infection, right foot  Type 2 diabetes mellitus      POST-OP DIAGNOSIS: Same as above.     PROCEDURE:   Fillet of toe, right hallux     ANESTHESIA: MAC     HEMOSTASIS: Pneumatic tourniquet to right ankle @ 250 mmHg for 25 minutes.     ESTIMATED BLOOD LOSS: Less than 20cc.     MATERIALS:   * No implants in log *     INJECTABLES: Preoperatively an injection of 1:1 mix of 0.5% marcaine plain and 1% lidocaine plain     SPECIMEN:   ID Type Source Tests Collected by Time Destination   A : RIGHT GREAT TOE Tissue Toe SURGICAL PATHOLOGY Damien Duran DPM 6/5/2024 1128           COMPLICATIONS: None     FINDINGS: Attention was directed to the distal right hallux where a chronic ulceration was noted. A fishmouth incision was created at the level of the DIPJ and the distal phalanx was disarticulated and passed to the back table as a specimen. The incision was then flushed and closed in layers.    INDICATION FOR PROCEDURE: The patient has had an infection of the right hallux for some time. This has failed conservative treatments thus far, and the patient elects to undergo surgical correction. All risks and benefits discussed with the patient in detail. No guarantees were given or implied. Consent signed and in the chart.    PROCEDURE IN DETAIL: The patient was transported from pre-op to the operating room and placed on the operating table in the supine position with a safety strap across the lap. After adequate sedation by the Anesthesia, a local block of 10cc of a 1:1 mixture of 1% lidocaine plain and 0.5% Marcaine plain was injected. The foot was then prepped and

## 2024-06-05 NOTE — ANESTHESIA PRE PROCEDURE
Department of Anesthesiology  Preprocedure Note       Name:  Meg Nguyen   Age:  58 y.o.  :  1965                                          MRN:  [id number]         Date:  2024      Surgeon: Surgeon(s):  Damien Duran DPM    Procedure: Procedure(s):  INCISION AND DRAINAGE OF BONE RIGHT HALLUX    Medications prior to admission:   Prior to Admission medications    Medication Sig Start Date End Date Taking? Authorizing Provider   TRULICITY 1.5 MG/0.5ML SC injection Inject 0.5 mLs into the skin once a week 24 Yes Og Beasley MD   doxycycline hyclate (VIBRA-TABS) 100 MG tablet Take 1 tablet by mouth 2 times daily 24  Yes Og Beasley MD   bumetanide (BUMEX) 1 MG tablet Take 1 tablet by mouth 2 times daily 22   Og Beasley MD   aspirin (ASPIRIN 81) 81 MG chewable tablet Take 1 tablet by mouth daily 22   Og Beasley MD   ferrous sulfate (IRON 325) 325 (65 Fe) MG tablet Take 1 tablet by mouth daily (with breakfast)    Og Beasley MD   folic acid (FOLVITE) 1 MG tablet Take 1 tablet by mouth daily    Og Beasley MD   lisinopril (PRINIVIL;ZESTRIL) 5 MG tablet Take 1 tablet by mouth daily    Og Beasley MD   atorvastatin (LIPITOR) 40 MG tablet Take 1 tablet by mouth daily    Og Beasley MD   carvedilol (COREG) 6.25 MG tablet Take 1 tablet by mouth 2 times daily    Og Beasley MD   vitamin B-12 (CYANOCOBALAMIN) 1000 MCG tablet Take 1 tablet by mouth daily    Og Beasley MD   Albuterol Sulfate (PROAIR HFA IN) Inhale 2 puffs into the lungs every 6 hours as needed (SOB)    Og Beasley MD   Prenatal Vit-Fe Fumarate-FA (PRENATAL COMPLETE PO) Take 1 tablet by mouth daily  22  Og Beasley MD       Current medications:    Current Facility-Administered Medications   Medication Dose Route Frequency Provider Last Rate Last Admin   • [START ON 2024] lidocaine PF 1 %

## 2024-06-05 NOTE — DISCHARGE INSTRUCTIONS
Podiatric Post Operative Instructions:  You have had a surgical procedure on your right foot.      Fluids and Diet:  Begin with clear liquids, broth, dry toast, and crackers.  If not nauseated then resume your regular pre-operative diet when you are ready  With your history of diabetes, please lower your intake sugar content food as this will negatively impact surgery.    Medications:  Take your prescriptions as directed  You are receiving new prescriptions for the pain medication Tylenol #3  If your pain is not severe then you may take the non-prescription medication that you normally take for aches and pains ie Tylenol and Ibuprofen (alternating), or if severe pain occurs these will serve as additional medication in conjuction with the Tylenol #3  You may resume your regularly scheduled medications (unless otherwise directed)  If any side effects or adverse reactions occur, discontinue the medication and contact your doctor.  Review the patient drug information that is provided before you take any medication    Ambulation and Activity:  You are advised to go directly home from the hospital  We recommend knee scooter if possible, you can also obtain these on [employer] for rather affordable and quickly obtainable.  You may put weight on the operated foot.  You should wear the surgical shoe at all times when awake.   Avoid stairs.  Do not lift or move heavy objects  Do not drive until cleared by your physician    Bandage and Wound Care Instructions:  Keep bandage clean and dry  Do NOT remove dressing/ splint  DO NOT get wet  Please use shower cover around leg if you do shower so that dressing does not get wet  Do not shower or bathe the operative extremity  Do not remove the bandage (unless otherwise directed)   Do not attempt to put anything between the cast or dressing and your skin, some itching is normal.    Ice and Elevation:  Elevate operative extremity as much as possible to reduce swelling and

## 2024-06-05 NOTE — PROGRESS NOTES
Patient blood sugar 69, patient not experiencing any symptoms.  Dr Pastrana notified.  Order received to give patient 250ml of D5 0.45NS.  Sugar recheck was 129.  Patient not symptomatic.

## 2024-06-05 NOTE — ANESTHESIA POSTPROCEDURE EVALUATION
Department of Anesthesiology  Postprocedure Note    Patient: Meg Nguyen  MRN: [id number]  YOB: 1965  Date of evaluation: 6/5/2024    Procedure Summary       Date: 06/05/24 Room / Location: [address]    Anesthesia Start: 1058 Anesthesia Stop: 1152    Procedure: RIGHT GREAT TOE FILET AT IPJ (Right) Diagnosis:       Acute hematogenous osteomyelitis, right ankle and foot (HCC)      (Acute hematogenous osteomyelitis, right ankle and foot (HCC) [M86.071])    Surgeons: Damien Duran DPM Responsible Provider: Dena Pastrana MD    Anesthesia Type: General ASA Status: 3            Anesthesia Type: General    [place] Phase I: [place] Score: 10    Ar[place]ina Phase II: [place] Score: 10    Anesthesia Post Evaluation    Patient location during evaluation: PACU  Patient participation: complete - patient participated  Level of consciousness: awake and alert  Airway patency: patent  Nausea & Vomiting: no nausea and no vomiting  Cardiovascular status: hemodynamically stable  Respiratory status: acceptable  Hydration status: euvolemic  Pain management: adequate    No notable events documented.

## 2024-06-05 NOTE — H&P
History and Physical Service   [facility]    HISTORY AND PHYSICAL EXAMINATION            Date of Evaluation: 6/5/2024  Patient name:  Meg Nguyen  MRN:   [id number]  YOB: 1965  PCP:    Anne Khalil    History Obtained From:     Patient, Medical records    History of Present Illness:     This is Meg Nguyen a 58 y.o. female who presents today for an INCISION AND DRAINAGE OF BONE RIGHT HALLUX by Dr. Duran, Damien CRAMER DPM due to Acute hematogenous osteomyelitis, right ankle and foot (HCC) . Patient states she first noticed a blood blister on the right hallux a couple months ago and started following-up with Dr. Duran at that time. The right hallux is now draining purulent discharge. Doxycycline was started 2 weeks ago. Known neuropathy. The right hallux is swollen, red, and has 4/10 pain on the pain scale. Pt denies fevers, chills, chest pain, dyspnea, and rashes. Known diabetes. POC blood glucose was initially 69 mg/dL. Donald Alexandre RN stated the pt was given D 5 1/2 in Pre-op per Dr. Pastrana's instruction. Repeat POC blood glucose was 128 mg/dL. Pt denies hypoglycemic symptoms at this time. Discussed the pt's repeat blood glucose level of 128 mg/dL with Dr. Pastrana. Aspirin, folic acid, Prenatal Complete, and vitamin B-12 were last taken on 6/03/2024 per pt.     History of a stroke in 2022. Pt states she has residual memory issues and an occasional stutter since the stroke. Pt denies residual weakness from the stroke.    CT angiogram carotid: 3/22/2022    Anatomical Region Laterality Modality   Neck -- Computed Tomography   Vascular -- --   Head and Neck -- --   Neuro Covera -- --     Narrative    CT CTA CAROTID    CLINICAL HISTORY:  Stroke follow-up TIA    COMPARISON:  3/17/2022    obtained.    FINDINGS:  No aortic aneurysm.  Proximal mid and distal common carotid arteries are normal.  Mild atherosclerotic calcification right carotid bulb.  Proximal mid  foot ([facility])     Plans:     1. INCISION AND DRAINAGE OF BONE RIGHT HALLUX       Saw MEGHAN Cee CNP  6/5/2024  10:31 AM

## 2024-06-07 LAB — SURGICAL PATHOLOGY REPORT: NORMAL

## 2024-08-09 ENCOUNTER — HOSPITAL ENCOUNTER (EMERGENCY)
Age: 59
Discharge: HOME | End: 2024-08-09
Attending: EMERGENCY MEDICINE
Payer: MEDICAID

## 2024-08-09 ENCOUNTER — APPOINTMENT (OUTPATIENT)
Dept: CT IMAGING | Age: 59
End: 2024-08-09
Payer: MEDICAID

## 2024-08-09 VITALS
TEMPERATURE: 98 F | HEART RATE: 80 BPM | WEIGHT: 200 LBS | DIASTOLIC BLOOD PRESSURE: 72 MMHG | BODY MASS INDEX: 27.51 KG/M2 | OXYGEN SATURATION: 100 % | RESPIRATION RATE: 16 BRPM | SYSTOLIC BLOOD PRESSURE: 125 MMHG

## 2024-08-09 DIAGNOSIS — R51.9 ACUTE NONINTRACTABLE HEADACHE, UNSPECIFIED HEADACHE TYPE: Primary | ICD-10-CM

## 2024-08-09 LAB
ANION GAP SERPL CALCULATED.3IONS-SCNC: 11 MMOL/L (ref 9–17)
BASOPHILS # BLD: <0.03 K/UL (ref 0–0.2)
BASOPHILS NFR BLD: 0 % (ref 0–2)
BUN SERPL-MCNC: 33 MG/DL (ref 6–20)
BUN/CREAT SERPL: 25 (ref 9–20)
CALCIUM SERPL-MCNC: 8.8 MG/DL (ref 8.6–10.4)
CHLORIDE SERPL-SCNC: 105 MMOL/L (ref 98–107)
CO2 SERPL-SCNC: 23 MMOL/L (ref 20–31)
CREAT SERPL-MCNC: 1.3 MG/DL (ref 0.5–0.9)
EOSINOPHIL # BLD: 0.09 K/UL (ref 0–0.44)
EOSINOPHILS RELATIVE PERCENT: 2 % (ref 1–4)
ERYTHROCYTE [DISTWIDTH] IN BLOOD BY AUTOMATED COUNT: 12.8 % (ref 11.8–14.4)
GFR, ESTIMATED: 48 ML/MIN/1.73M2
GLUCOSE SERPL-MCNC: 72 MG/DL (ref 70–99)
HCT VFR BLD AUTO: 30.1 % (ref 36.3–47.1)
HGB BLD-MCNC: 10.1 G/DL (ref 11.9–15.1)
IMM GRANULOCYTES # BLD AUTO: 0.01 K/UL (ref 0–0.3)
IMM GRANULOCYTES NFR BLD: 0 %
LYMPHOCYTES NFR BLD: 1.73 K/UL (ref 1.1–3.7)
LYMPHOCYTES RELATIVE PERCENT: 33 % (ref 24–43)
MCH RBC QN AUTO: 30.7 PG (ref 25.2–33.5)
MCHC RBC AUTO-ENTMCNC: 33.6 G/DL (ref 28.4–34.8)
MCV RBC AUTO: 91.5 FL (ref 82.6–102.9)
MONOCYTES NFR BLD: 0.41 K/UL (ref 0.1–1.2)
MONOCYTES NFR BLD: 8 % (ref 3–12)
NEUTROPHILS NFR BLD: 57 % (ref 36–65)
NEUTS SEG NFR BLD: 2.96 K/UL (ref 1.5–8.1)
NRBC BLD-RTO: 0 PER 100 WBC
PLATELET # BLD AUTO: 133 K/UL (ref 138–453)
PMV BLD AUTO: 9.6 FL (ref 8.1–13.5)
POTASSIUM SERPL-SCNC: 4 MMOL/L (ref 3.7–5.3)
RBC # BLD AUTO: 3.29 M/UL (ref 3.95–5.11)
SODIUM SERPL-SCNC: 139 MMOL/L (ref 135–144)
WBC OTHER # BLD: 5.2 K/UL (ref 3.5–11.3)

## 2024-08-09 PROCEDURE — 96365 THER/PROPH/DIAG IV INF INIT: CPT

## 2024-08-09 PROCEDURE — 96375 TX/PRO/DX INJ NEW DRUG ADDON: CPT

## 2024-08-09 PROCEDURE — 85025 COMPLETE CBC W/AUTO DIFF WBC: CPT

## 2024-08-09 PROCEDURE — 80048 BASIC METABOLIC PNL TOTAL CA: CPT

## 2024-08-09 PROCEDURE — 6360000002 HC RX W HCPCS: Performed by: EMERGENCY MEDICINE

## 2024-08-09 PROCEDURE — 2580000003 HC RX 258: Performed by: EMERGENCY MEDICINE

## 2024-08-09 PROCEDURE — 70450 CT HEAD/BRAIN W/O DYE: CPT

## 2024-08-09 PROCEDURE — 99284 EMERGENCY DEPT VISIT MOD MDM: CPT

## 2024-08-09 RX ORDER — DEXAMETHASONE SODIUM PHOSPHATE 10 MG/ML
10 INJECTION, SOLUTION INTRAMUSCULAR; INTRAVENOUS ONCE
Status: COMPLETED | OUTPATIENT
Start: 2024-08-09 | End: 2024-08-09

## 2024-08-09 RX ORDER — ONDANSETRON 2 MG/ML
4 INJECTION INTRAMUSCULAR; INTRAVENOUS ONCE
Status: COMPLETED | OUTPATIENT
Start: 2024-08-09 | End: 2024-08-09

## 2024-08-09 RX ORDER — 0.9 % SODIUM CHLORIDE 0.9 %
1000 INTRAVENOUS SOLUTION INTRAVENOUS ONCE
Status: COMPLETED | OUTPATIENT
Start: 2024-08-09 | End: 2024-08-09

## 2024-08-09 RX ORDER — MAGNESIUM SULFATE 1 G/100ML
1000 INJECTION INTRAVENOUS ONCE
Status: COMPLETED | OUTPATIENT
Start: 2024-08-09 | End: 2024-08-09

## 2024-08-09 RX ADMIN — ONDANSETRON 4 MG: 2 INJECTION INTRAMUSCULAR; INTRAVENOUS at 14:57

## 2024-08-09 RX ADMIN — DEXAMETHASONE SODIUM PHOSPHATE 10 MG: 10 INJECTION, SOLUTION INTRAMUSCULAR; INTRAVENOUS at 14:58

## 2024-08-09 RX ADMIN — SODIUM CHLORIDE 1000 ML: 9 INJECTION, SOLUTION INTRAVENOUS at 14:54

## 2024-08-09 RX ADMIN — MAGNESIUM SULFATE HEPTAHYDRATE 1000 MG: 1 INJECTION, SOLUTION INTRAVENOUS at 14:56

## 2024-08-09 ASSESSMENT — ENCOUNTER SYMPTOMS
EYE PAIN: 0
EYE DISCHARGE: 0
BACK PAIN: 0
SHORTNESS OF BREATH: 0
ABDOMINAL DISTENTION: 0
FACIAL SWELLING: 0
ABDOMINAL PAIN: 0
CHEST TIGHTNESS: 0

## 2024-08-09 ASSESSMENT — PAIN SCALES - GENERAL: PAINLEVEL_OUTOF10: 8

## 2024-08-09 ASSESSMENT — PAIN - FUNCTIONAL ASSESSMENT: PAIN_FUNCTIONAL_ASSESSMENT: 0-10

## 2024-08-09 NOTE — ED PROVIDER NOTES
equal, round, and reactive to light.   Cardiovascular:      Rate and Rhythm: Normal rate and regular rhythm.   Pulmonary:      Effort: Pulmonary effort is normal.      Breath sounds: Normal breath sounds.   Abdominal:      General: Bowel sounds are normal.      Palpations: Abdomen is soft.   Musculoskeletal:         General: Normal range of motion.      Cervical back: Normal range of motion and neck supple.   Skin:     General: Skin is warm.      Capillary Refill: Capillary refill takes less than 2 seconds.   Neurological:      Mental Status: She is alert and oriented to person, place, and time.         MEDICAL DECISION MAKING / ED COURSE:   Summary of Patient Presentation:    The patient is a 59-year-old female who presented to the emergency department secondary to headache        1)  Number and Complexity of Problems Addressed at this Encounter  Problem List This Visit: Headache    Differential Diagnosis: Headache, migraine, complicated    Diagnoses Considered but Do Not Suspect:  NA    Pertinent Comorbid Conditions:  NA        2)  Data Reviewed  My EKG interpretation:  NA     Decision Rules/Scores utilized:  NA    HEART SCORE: NA*       NIH STROKE SCALE  NIH Stroke Scale  Level of Consciousness (1a): Alert  LOC Questions (1b): Answers both correctly  LOC Commands (1c): Performs both tasks correctly  Best Gaze (2): Normal  Visual (3): No visual loss  Facial Palsy (4): Normal symmetrical movement  Motor Arm, Left (5a): No drift  Motor Arm, Right (5b): No drift  Motor Leg, Left (6a): No drift  Motor Leg, Right (6b): No drift  Limb Ataxia (7): Absent  Sensory (8): Normal  Best Language (9): No aphasia  Dysarthria (10): Normal  Extinction and Inattention (11): No abnormality  Total: 0      Tests considered but not ordered and why:  none    External Documents Reviewed:  none    Imaging that is independently reviewed and interpreted by me as:  none    SEPSIS    Is this patient to be included in the SEP-1 core measure

## (undated) DEVICE — SUTURE MCRYL SZ 3-0 L27IN ABSRB UD L24MM PS-1 3/8 CIR PRIM Y936H

## (undated) DEVICE — TUBING SUCT 10FR MAL ALUM SHFT FN CAP VENT UNIV CONN W/ OBT

## (undated) DEVICE — SUTURE MCRYL SZ 2-0 L36IN ABSRB UD L36MM CT-1 1/2 CIR Y945H

## (undated) DEVICE — YANKAUER,FLEXIBLE HANDLE,REGLR CAPACITY: Brand: MEDLINE INDUSTRIES, INC.

## (undated) DEVICE — INTENDED FOR TISSUE SEPARATION, AND OTHER PROCEDURES THAT REQUIRE A SHARP SURGICAL BLADE TO PUNCTURE OR CUT.: Brand: BARD-PARKER ® CARBON RIB-BACK BLADES

## (undated) DEVICE — NEEDLE HYPO 25GA L1.5IN BLU POLYPR HUB S STL REG BVL STR

## (undated) DEVICE — BANDAGE COMPR W4INXL5YD WHT BGE POLY COT M E WRP WV HK AND

## (undated) DEVICE — SVMMC POD PK

## (undated) DEVICE — TUBING, SUCTION, 1/4" X 12', STRAIGHT: Brand: MEDLINE

## (undated) DEVICE — Z DISCONTINUED NO SUB IDED SPLINT ORTH W5XL30IN LAYERED FBRGLS FOAM PD BRTH BK MOLD

## (undated) DEVICE — DRAPE,REIN 53X77,STERILE: Brand: MEDLINE

## (undated) DEVICE — SHOE POSTOP L M 9.5-12 WOM 10.5-13 SQUARED HI ANK STRP RIG

## (undated) DEVICE — SUTURE N ABSRB L 18 IN SZ 4-0 NDL L 19 MM NYL MONOFILAMENT

## (undated) DEVICE — SPONGE LAP W18XL18IN WHT COT 4 PLY FLD STRUNG RADPQ DISP ST

## (undated) DEVICE — SYRINGE MED 10ML LUERLOCK TIP W/O SFTY DISP

## (undated) DEVICE — DRAPE C ARM UNIV W41XL74IN CLR PLAS XR VELC CLSR POLY STRP

## (undated) DEVICE — NEEDLE SPNL 18GA L3.5IN W/ QNCKE SHARPER BVL DURA CLICK

## (undated) DEVICE — SMALL TEAR CROSS CUT RASP (11.0 X 5.0MM)

## (undated) DEVICE — SUTURE MCRYL SZ 3-0 L27IN ABSRB UD L26MM SH 1/2 CIR Y416H

## (undated) DEVICE — 4-PORT MANIFOLD: Brand: NEPTUNE 2

## (undated) DEVICE — GLOVE ORTHO 8   MSG9480

## (undated) DEVICE — SINGLE USE DEVICE INTENDED TO COVER EXPOSED ENDS OF ORTHOPEDIC PIN AND K-WIRES TO HELP PROTECT THE EXPOSED WIRE FROM SNAGGING ON CLOTHING.: Brand: OXBORO™ PIN COVER

## (undated) DEVICE — GOWN,AURORA,NONRNF,XL,30/CS: Brand: MEDLINE

## (undated) DEVICE — Device

## (undated) DEVICE — Z INACTIVE USE 2635508 SOLUTION IRRIG 500ML 0.9% SOD CHL USP POUR PLAS BTL

## (undated) DEVICE — BANDAGE,GAUZE,BULKEE II,4.5"X4.1YD,STRL: Brand: MEDLINE

## (undated) DEVICE — SKIN PREP TRAY W/CHG: Brand: MEDLINE INDUSTRIES, INC.

## (undated) DEVICE — STAZ LOWER EXTREMITY: Brand: MEDLINE INDUSTRIES, INC.

## (undated) DEVICE — APPLICATOR MEDICATED 26 CC SOLUTION HI LT ORNG CHLORAPREP

## (undated) DEVICE — GLOVE SURG 7.5 11.7IN BEAD CUF LIGHT BRN SENSICARE LTX FREE

## (undated) DEVICE — SUTURE MONOCRYL + ABSORBABLE MONOFILAMENT 3-0 SH 27 IN UD  MCP316H

## (undated) DEVICE — TOURNIQUET CUF BLD PRESSURE 4X18 IN 2 PRT SINGLE BLDR RED

## (undated) DEVICE — BNDG,ELSTC,MATRIX,STRL,4"X5YD,LF,HOOK&LP: Brand: MEDLINE

## (undated) DEVICE — SWAB CULT CLR BLU PLAS RAYON LIQ AMIES AERB ANAERB FRIC CAP

## (undated) DEVICE — TOWEL,OR,DSP,ST,BLUE,DLX,XR,4/PK,20PK/CS: Brand: MEDLINE

## (undated) DEVICE — THIN OFFSET (9.0 X 0.38 X 25.0MM)

## (undated) DEVICE — GLOVE ORANGE PI 7 1/2   MSG9075

## (undated) DEVICE — STRIP SKIN CLSR W0.25XL4IN WHT SPUNBOUND FBR NYL HI ADH

## (undated) DEVICE — TOWEL,OR,DSP,ST,NATURAL,DLX,4/PK,20PK/CS: Brand: MEDLINE

## (undated) DEVICE — SUTURE PROL SZ 4-0 L30IN NONABSORBABLE BLU SH L26MM 1/2 CIR 8831H

## (undated) DEVICE — SUTURE NONABSORBABLE MONOFILAMENT 2-0 FS 18 IN ETHILON 664H

## (undated) DEVICE — GLOVE SURG SZ 8 CRM LTX FREE POLYISOPRENE POLYMER BEAD ANTI

## (undated) DEVICE — SUTURE ETHLN SZ 3-0 L18IN NONABSORBABLE BLK L24MM PS-1 3/8 1663G